# Patient Record
Sex: FEMALE | Race: WHITE | Employment: OTHER | URBAN - NONMETROPOLITAN AREA
[De-identification: names, ages, dates, MRNs, and addresses within clinical notes are randomized per-mention and may not be internally consistent; named-entity substitution may affect disease eponyms.]

---

## 2024-05-17 PROBLEM — I95.9 HYPOTENSION: Status: ACTIVE | Noted: 2024-05-17

## 2024-05-18 ENCOUNTER — HOSPITAL ENCOUNTER (INPATIENT)
Age: 79
LOS: 1 days | Discharge: HOME | End: 2024-05-18
Attending: STUDENT IN AN ORGANIZED HEALTH CARE EDUCATION/TRAINING PROGRAM
Payer: MEDICARE

## 2024-05-18 VITALS
HEART RATE: 84 BPM | HEIGHT: 64 IN | BODY MASS INDEX: 28.53 KG/M2 | SYSTOLIC BLOOD PRESSURE: 86 MMHG | WEIGHT: 167.11 LBS | TEMPERATURE: 97.2 F | DIASTOLIC BLOOD PRESSURE: 43 MMHG | RESPIRATION RATE: 17 BRPM | OXYGEN SATURATION: 100 %

## 2024-05-18 LAB
ALBUMIN SERPL BCG-MCNC: 3.5 G/DL (ref 3.5–5.1)
ALP SERPL-CCNC: 297 U/L (ref 38–126)
ALT SERPL W/O P-5'-P-CCNC: 12 U/L (ref 11–66)
ANION GAP SERPL CALC-SCNC: 13 MEQ/L (ref 8–16)
AST SERPL-CCNC: 29 U/L (ref 5–40)
BASOPHILS ABSOLUTE: 0 THOU/MM3 (ref 0–0.1)
BASOPHILS NFR BLD AUTO: 0.5 %
BILIRUB SERPL-MCNC: 0.3 MG/DL (ref 0.3–1.2)
BUN SERPL-MCNC: 30 MG/DL (ref 7–22)
CALCIUM SERPL-MCNC: 9.4 MG/DL (ref 8.5–10.5)
CHLORIDE SERPL-SCNC: 96 MEQ/L (ref 98–111)
CO2 SERPL-SCNC: 28 MEQ/L (ref 23–33)
CREAT SERPL-MCNC: 6 MG/DL (ref 0.4–1.2)
DEPRECATED RDW RBC AUTO: 54.4 FL (ref 35–45)
EOSINOPHIL NFR BLD AUTO: 2.9 %
EOSINOPHILS ABSOLUTE: 0.2 THOU/MM3 (ref 0–0.4)
ERYTHROCYTE [DISTWIDTH] IN BLOOD BY AUTOMATED COUNT: 15.3 % (ref 11.5–14.5)
GFR SERPL CREATININE-BSD FRML MDRD: 7 ML/MIN/1.73M2
GLUCOSE SERPL-MCNC: 79 MG/DL (ref 70–108)
HBV CORE IGM SERPL QL IA: NEGATIVE
HBV SURFACE AB SER QL IA: NEGATIVE
HBV SURFACE AG SERPL QL IA: NEGATIVE
HCT VFR BLD AUTO: 31.5 % (ref 37–47)
HGB BLD-MCNC: 9.8 GM/DL (ref 12–16)
IMM GRANULOCYTES # BLD AUTO: 0.02 THOU/MM3 (ref 0–0.07)
IMM GRANULOCYTES NFR BLD AUTO: 0.3 %
LYMPHOCYTES ABSOLUTE: 1.7 THOU/MM3 (ref 1–4.8)
LYMPHOCYTES NFR BLD AUTO: 23.3 %
MCH RBC QN AUTO: 31.3 PG (ref 26–33)
MCHC RBC AUTO-ENTMCNC: 31.1 GM/DL (ref 32.2–35.5)
MCV RBC AUTO: 100.6 FL (ref 81–99)
MONOCYTES ABSOLUTE: 1.3 THOU/MM3 (ref 0.4–1.3)
MONOCYTES NFR BLD AUTO: 18 %
NEUTROPHILS ABSOLUTE: 4 THOU/MM3 (ref 1.8–7.7)
NEUTROPHILS NFR BLD AUTO: 55 %
NRBC BLD AUTO-RTO: 0 /100 WBC
PLATELET # BLD AUTO: 288 THOU/MM3 (ref 130–400)
PMV BLD AUTO: 9.2 FL (ref 9.4–12.4)
POTASSIUM SERPL-SCNC: 4.6 MEQ/L (ref 3.5–5.2)
PROT SERPL-MCNC: 6.2 G/DL (ref 6.1–8)
RBC # BLD AUTO: 3.13 MILL/MM3 (ref 4.2–5.4)
SODIUM SERPL-SCNC: 137 MEQ/L (ref 135–145)
WBC # BLD AUTO: 7.3 THOU/MM3 (ref 4.8–10.8)

## 2024-05-18 PROCEDURE — 6370000000 HC RX 637 (ALT 250 FOR IP)

## 2024-05-18 PROCEDURE — 86705 HEP B CORE ANTIBODY IGM: CPT

## 2024-05-18 PROCEDURE — 99223 1ST HOSP IP/OBS HIGH 75: CPT

## 2024-05-18 PROCEDURE — 6360000002 HC RX W HCPCS: Performed by: INTERNAL MEDICINE

## 2024-05-18 PROCEDURE — 80053 COMPREHEN METABOLIC PANEL: CPT

## 2024-05-18 PROCEDURE — 2580000003 HC RX 258: Performed by: PHYSICIAN ASSISTANT

## 2024-05-18 PROCEDURE — 87340 HEPATITIS B SURFACE AG IA: CPT

## 2024-05-18 PROCEDURE — 1200000003 HC TELEMETRY R&B

## 2024-05-18 PROCEDURE — 90935 HEMODIALYSIS ONE EVALUATION: CPT

## 2024-05-18 PROCEDURE — P9047 ALBUMIN (HUMAN), 25%, 50ML: HCPCS | Performed by: INTERNAL MEDICINE

## 2024-05-18 PROCEDURE — 86706 HEP B SURFACE ANTIBODY: CPT

## 2024-05-18 PROCEDURE — 85025 COMPLETE CBC W/AUTO DIFF WBC: CPT

## 2024-05-18 PROCEDURE — 6370000000 HC RX 637 (ALT 250 FOR IP): Performed by: INTERNAL MEDICINE

## 2024-05-18 PROCEDURE — 36415 COLL VENOUS BLD VENIPUNCTURE: CPT

## 2024-05-18 RX ORDER — ALBUMIN (HUMAN) 12.5 G/50ML
25 SOLUTION INTRAVENOUS ONCE
Status: COMPLETED | OUTPATIENT
Start: 2024-05-18 | End: 2024-05-18

## 2024-05-18 RX ORDER — MIDODRINE HYDROCHLORIDE 10 MG/1
15 TABLET ORAL 3 TIMES DAILY
Qty: 120 TABLET | Refills: 0 | Status: SHIPPED | OUTPATIENT
Start: 2024-05-18

## 2024-05-18 RX ORDER — ONDANSETRON 2 MG/ML
4 INJECTION INTRAMUSCULAR; INTRAVENOUS EVERY 6 HOURS PRN
Status: DISCONTINUED | OUTPATIENT
Start: 2024-05-18 | End: 2024-05-18 | Stop reason: HOSPADM

## 2024-05-18 RX ORDER — ACETAMINOPHEN 650 MG/1
650 SUPPOSITORY RECTAL EVERY 6 HOURS PRN
Status: DISCONTINUED | OUTPATIENT
Start: 2024-05-18 | End: 2024-05-18 | Stop reason: SDUPTHER

## 2024-05-18 RX ORDER — LANOLIN ALCOHOL/MO/W.PET/CERES
3 CREAM (GRAM) TOPICAL NIGHTLY PRN
COMMUNITY

## 2024-05-18 RX ORDER — SODIUM CHLORIDE 0.9 % (FLUSH) 0.9 %
5-40 SYRINGE (ML) INJECTION PRN
Status: DISCONTINUED | OUTPATIENT
Start: 2024-05-18 | End: 2024-05-18 | Stop reason: HOSPADM

## 2024-05-18 RX ORDER — OXYCODONE HYDROCHLORIDE 5 MG/1
5 TABLET ORAL EVERY 8 HOURS PRN
COMMUNITY

## 2024-05-18 RX ORDER — ACETAMINOPHEN 325 MG/1
650 TABLET ORAL EVERY 6 HOURS PRN
Status: DISCONTINUED | OUTPATIENT
Start: 2024-05-18 | End: 2024-05-18 | Stop reason: HOSPADM

## 2024-05-18 RX ORDER — POLYETHYLENE GLYCOL 3350 17 G/17G
17 POWDER, FOR SOLUTION ORAL DAILY PRN
Status: DISCONTINUED | OUTPATIENT
Start: 2024-05-18 | End: 2024-05-18 | Stop reason: HOSPADM

## 2024-05-18 RX ORDER — ALBUTEROL SULFATE 90 UG/1
2 AEROSOL, METERED RESPIRATORY (INHALATION) EVERY 6 HOURS PRN
Status: DISCONTINUED | OUTPATIENT
Start: 2024-05-18 | End: 2024-05-18 | Stop reason: HOSPADM

## 2024-05-18 RX ORDER — ACETAMINOPHEN 325 MG/1
650 TABLET ORAL EVERY 6 HOURS PRN
Status: DISCONTINUED | OUTPATIENT
Start: 2024-05-18 | End: 2024-05-18 | Stop reason: SDUPTHER

## 2024-05-18 RX ORDER — IPRATROPIUM BROMIDE AND ALBUTEROL SULFATE 2.5; .5 MG/3ML; MG/3ML
1 SOLUTION RESPIRATORY (INHALATION) EVERY 4 HOURS
COMMUNITY

## 2024-05-18 RX ORDER — MIDODRINE HYDROCHLORIDE 10 MG/1
10 TABLET ORAL 3 TIMES DAILY
Status: DISCONTINUED | OUTPATIENT
Start: 2024-05-18 | End: 2024-05-18

## 2024-05-18 RX ORDER — POLYETHYLENE GLYCOL 3350 17 G/17G
17 POWDER, FOR SOLUTION ORAL DAILY
Status: DISCONTINUED | OUTPATIENT
Start: 2024-05-18 | End: 2024-05-18 | Stop reason: HOSPADM

## 2024-05-18 RX ORDER — FERROUS SULFATE 325(65) MG
325 TABLET ORAL
COMMUNITY

## 2024-05-18 RX ORDER — SODIUM CHLORIDE 9 MG/ML
INJECTION, SOLUTION INTRAVENOUS PRN
Status: DISCONTINUED | OUTPATIENT
Start: 2024-05-18 | End: 2024-05-18 | Stop reason: HOSPADM

## 2024-05-18 RX ORDER — SENNOSIDES A AND B 8.6 MG/1
1 TABLET, FILM COATED ORAL NIGHTLY
Status: DISCONTINUED | OUTPATIENT
Start: 2024-05-18 | End: 2024-05-18 | Stop reason: HOSPADM

## 2024-05-18 RX ORDER — LEVOTHYROXINE SODIUM 0.05 MG/1
50 TABLET ORAL DAILY
Status: DISCONTINUED | OUTPATIENT
Start: 2024-05-18 | End: 2024-05-18 | Stop reason: HOSPADM

## 2024-05-18 RX ORDER — ONDANSETRON 4 MG/1
4 TABLET, ORALLY DISINTEGRATING ORAL EVERY 8 HOURS PRN
Status: DISCONTINUED | OUTPATIENT
Start: 2024-05-18 | End: 2024-05-18 | Stop reason: HOSPADM

## 2024-05-18 RX ORDER — OXYCODONE HYDROCHLORIDE 5 MG/1
5 TABLET ORAL EVERY 8 HOURS PRN
Status: DISCONTINUED | OUTPATIENT
Start: 2024-05-18 | End: 2024-05-18 | Stop reason: HOSPADM

## 2024-05-18 RX ORDER — LANOLIN ALCOHOL/MO/W.PET/CERES
3 CREAM (GRAM) TOPICAL NIGHTLY PRN
Status: DISCONTINUED | OUTPATIENT
Start: 2024-05-18 | End: 2024-05-18 | Stop reason: HOSPADM

## 2024-05-18 RX ORDER — MIDODRINE HYDROCHLORIDE 10 MG/1
15 TABLET ORAL 3 TIMES DAILY
Qty: 120 TABLET | Refills: 0 | Status: SHIPPED | OUTPATIENT
Start: 2024-05-18 | End: 2024-05-18

## 2024-05-18 RX ORDER — OMEPRAZOLE 20 MG/1
40 CAPSULE, DELAYED RELEASE ORAL DAILY
COMMUNITY

## 2024-05-18 RX ORDER — SODIUM CHLORIDE 0.9 % (FLUSH) 0.9 %
5-40 SYRINGE (ML) INJECTION EVERY 12 HOURS SCHEDULED
Status: DISCONTINUED | OUTPATIENT
Start: 2024-05-18 | End: 2024-05-18 | Stop reason: HOSPADM

## 2024-05-18 RX ORDER — PANTOPRAZOLE SODIUM 40 MG/1
40 TABLET, DELAYED RELEASE ORAL
Status: DISCONTINUED | OUTPATIENT
Start: 2024-05-18 | End: 2024-05-18 | Stop reason: HOSPADM

## 2024-05-18 RX ORDER — IPRATROPIUM BROMIDE AND ALBUTEROL SULFATE 2.5; .5 MG/3ML; MG/3ML
1 SOLUTION RESPIRATORY (INHALATION) EVERY 4 HOURS PRN
Status: DISCONTINUED | OUTPATIENT
Start: 2024-05-18 | End: 2024-05-18 | Stop reason: HOSPADM

## 2024-05-18 RX ORDER — ACETAMINOPHEN 160 MG
TABLET,DISINTEGRATING ORAL
COMMUNITY

## 2024-05-18 RX ORDER — MIDODRINE HYDROCHLORIDE 5 MG/1
10 TABLET ORAL 3 TIMES DAILY
Status: ON HOLD | COMMUNITY
End: 2024-05-18

## 2024-05-18 RX ORDER — ACETAMINOPHEN 650 MG/1
650 SUPPOSITORY RECTAL EVERY 6 HOURS PRN
Status: DISCONTINUED | OUTPATIENT
Start: 2024-05-18 | End: 2024-05-18 | Stop reason: HOSPADM

## 2024-05-18 RX ORDER — LEVOTHYROXINE SODIUM 0.05 MG/1
50 TABLET ORAL DAILY
COMMUNITY

## 2024-05-18 RX ORDER — CETIRIZINE HYDROCHLORIDE 10 MG/1
10 TABLET ORAL DAILY
Status: DISCONTINUED | OUTPATIENT
Start: 2024-05-18 | End: 2024-05-18 | Stop reason: HOSPADM

## 2024-05-18 RX ORDER — CETIRIZINE HYDROCHLORIDE 10 MG/1
10 TABLET ORAL DAILY
COMMUNITY

## 2024-05-18 RX ORDER — ALBUTEROL SULFATE 90 UG/1
2 AEROSOL, METERED RESPIRATORY (INHALATION) EVERY 6 HOURS PRN
COMMUNITY

## 2024-05-18 RX ORDER — DOCUSATE SODIUM 100 MG/1
100 CAPSULE, LIQUID FILLED ORAL DAILY
Status: DISCONTINUED | OUTPATIENT
Start: 2024-05-18 | End: 2024-05-18 | Stop reason: HOSPADM

## 2024-05-18 RX ADMIN — MIDODRINE HYDROCHLORIDE 10 MG: 10 TABLET ORAL at 08:26

## 2024-05-18 RX ADMIN — PANTOPRAZOLE SODIUM 40 MG: 40 TABLET, DELAYED RELEASE ORAL at 05:18

## 2024-05-18 RX ADMIN — ALBUMIN (HUMAN) 25 G: 0.25 INJECTION, SOLUTION INTRAVENOUS at 10:51

## 2024-05-18 RX ADMIN — LEVOTHYROXINE SODIUM 50 MCG: 0.05 TABLET ORAL at 05:18

## 2024-05-18 RX ADMIN — CETIRIZINE HYDROCHLORIDE 10 MG: 10 TABLET, FILM COATED ORAL at 08:26

## 2024-05-18 RX ADMIN — SODIUM CHLORIDE, PRESERVATIVE FREE 10 ML: 5 INJECTION INTRAVENOUS at 08:26

## 2024-05-18 RX ADMIN — DOCUSATE SODIUM 100 MG: 100 CAPSULE, LIQUID FILLED ORAL at 08:26

## 2024-05-18 RX ADMIN — MIDODRINE HYDROCHLORIDE 15 MG: 10 TABLET ORAL at 15:09

## 2024-05-18 NOTE — RT PROTOCOL NOTE
order mode.        4-6 - enter or revise RT Bronchodilator order(s) to two equivalent RT bronchodilator orders with one order with BID Frequency and one order with Frequency of every 4 hours PRN wheezing or increased work of breathing using Per Protocol order mode.        7-10 - enter or revise RT Bronchodilator order(s) to two equivalent RT bronchodilator orders with one order with TID Frequency and one order with Frequency of every 4 hours PRN wheezing or increased work of breathing using Per Protocol order mode.       11-13 - enter or revise RT Bronchodilator order(s) to one equivalent RT bronchodilator order with QID Frequency and an Albuterol order with Frequency of every 4 hours PRN wheezing or increased work of breathing using Per Protocol order mode.      Greater than 13 - enter or revise RT Bronchodilator order(s) to one equivalent RT bronchodilator order with every 4 hours Frequency and an Albuterol order with Frequency of every 2 hours PRN wheezing or increased work of breathing using Per Protocol order mode.     RT to enter RT Home Evaluation for COPD & MDI Assessment order using Per Protocol order mode.    Electronically signed by Mauro Bacon RCP on 5/18/2024 at 6:52 AM

## 2024-05-18 NOTE — PROGRESS NOTES
Discharge teaching and instructions for diagnosis/procedure of hypotension completed with patient using teachback method. AVS reviewed. Printed prescriptions given to patient. Patient voiced understanding regarding prescriptions, follow up appointments, and care of self at home. Discharged in a wheelchair to  home with support per family

## 2024-05-18 NOTE — CONSULTS
[facility]  Nephrology Attending Consult Note      Reason for Consult:  ESRD  Requesting Physician:  Erin Alvarado    CHIEF COMPLAINT:  Hypotension     History Obtained From:  patient, electronic medical record    HISTORY OF PRESENT ILLNESS:    Patient with end-stage renal disease on hemodialysis every Monday Wednesday Friday chronic hypotension on midodrine severe pulm hypertension on selexipag COPD paroxysmal atrial fibrillation transferred from outside hospital.  Patient went to outpatient dialysis unit yesterday found to be hypotensive therefore patient was sent to emergency room for further evaluation.  No chest pain, shortness of breath, nausea or vomiting.  No fever or chills.  Patient had generalized weakness.  Patient was seen and examined during hemodialysis    Past Medical History:    End-stage renal disease on hemodialysis  Paroxysmal atrial fibrillation  Severe pulmonary hypertension  Chronic hypotension    Past Surgical History:    Right upper extremity AV fistula placement    Current Medications:    Current Facility-Administered Medications: sodium chloride flush 0.9 % injection 5-40 mL, 5-40 mL, IntraVENous, 2 times per day  sodium chloride flush 0.9 % injection 5-40 mL, 5-40 mL, IntraVENous, PRN  0.9 % sodium chloride infusion, , IntraVENous, PRN  ondansetron (ZOFRAN-ODT) disintegrating tablet 4 mg, 4 mg, Oral, Q8H PRN **OR** ondansetron (ZOFRAN) injection 4 mg, 4 mg, IntraVENous, Q6H PRN  acetaminophen (TYLENOL) tablet 650 mg, 650 mg, Oral, Q6H PRN **OR** acetaminophen (TYLENOL) suppository 650 mg, 650 mg, Rectal, Q6H PRN  albuterol sulfate HFA (PROVENTIL;VENTOLIN;PROAIR) 108 (90 Base) MCG/ACT inhaler 2 puff, 2 puff, Inhalation, Q6H PRN  cetirizine (ZYRTEC) tablet 10 mg, 10 mg, Oral, Daily  ipratropium 0.5 mg-albuterol 2.5 mg (DUONEB) nebulizer solution 1 Dose, 1 Dose, Inhalation, Q4H PRN  levothyroxine (SYNTHROID) tablet 50 mcg, 50 mcg, Oral, Daily  melatonin tablet 3 mg,

## 2024-05-18 NOTE — FLOWSHEET NOTE
Stable 3.5 hour TX completed. Removed 1.5 L of fluid as ordered. Gave albumin 25g to help with fluid removal. pt tolerated well. Bilateral cath ports flushed with normal saline, clamped, and secured with tegos. CVC drsg clean, dry, and intact. Report called to primary RN. TX record printed for scanning into EMR.   05/18/24 0915 05/18/24 1316   Vital Signs   BP (!) 100/57 104/63   Temp 97.8 °F (36.6 °C) 97.2 °F (36.2 °C)   Pulse 88 84   Respirations 17 17   SpO2 99 % 100 %   Weight - Scale 77.3 kg (170 lb 6.7 oz) 75.8 kg (167 lb 1.7 oz)   Weight Method Bed scale Bed scale   Percent Weight Change -3.01 -1.94   Post-Hemodialysis Assessment   Post-Treatment Procedures  --  Blood returned;Catheter Capped, clamped with Saline x2 ports   Machine Disinfection Process  --  Acid/Vinegar Clean;Heat Disinfect;Exterior Machine Disinfection   Blood Volume Processed (Liters)  --  70.6 L   Dialyzer Clearance  --  Lightly streaked   Duration of Treatment (minutes)  --  210 minutes   Heparin Amount Administered During Treatment (mL)  --  0 mL   Hemodialysis Intake (ml)  --  400 ml   Hemodialysis Output (ml)  --  1900 ml   NET Removed (ml)  --  1500   Tolerated Treatment  --  Good

## 2024-05-20 PROBLEM — N18.6 ESRD (END STAGE RENAL DISEASE) (HCC): Status: ACTIVE | Noted: 2024-05-20

## 2024-12-06 ENCOUNTER — HOSPITAL ENCOUNTER (INPATIENT)
Age: 79
LOS: 4 days | Discharge: ANOTHER HOSPITAL | DRG: 602 | End: 2024-12-10
Attending: STUDENT IN AN ORGANIZED HEALTH CARE EDUCATION/TRAINING PROGRAM | Admitting: INTERNAL MEDICINE
Payer: MEDICARE

## 2024-12-06 DIAGNOSIS — I48.91 A-FIB (HCC): ICD-10-CM

## 2024-12-06 LAB
ALBUMIN SERPL BCG-MCNC: 3.5 G/DL (ref 3.5–5.1)
ALP SERPL-CCNC: 286 U/L (ref 38–126)
ALT SERPL W/O P-5'-P-CCNC: 15 U/L (ref 11–66)
ANION GAP SERPL CALC-SCNC: 15 MEQ/L (ref 8–16)
AST SERPL-CCNC: 26 U/L (ref 5–40)
BASOPHILS ABSOLUTE: 0 THOU/MM3 (ref 0–0.1)
BASOPHILS NFR BLD AUTO: 0.4 %
BILIRUB CONJ SERPL-MCNC: 0.2 MG/DL (ref 0.1–13.8)
BILIRUB SERPL-MCNC: 0.4 MG/DL (ref 0.3–1.2)
BUN SERPL-MCNC: 35 MG/DL (ref 7–22)
CALCIUM SERPL-MCNC: 9.2 MG/DL (ref 8.5–10.5)
CHLORIDE SERPL-SCNC: 94 MEQ/L (ref 98–111)
CO2 SERPL-SCNC: 24 MEQ/L (ref 23–33)
CREAT SERPL-MCNC: 5.7 MG/DL (ref 0.4–1.2)
CRP SERPL-MCNC: 2 MG/DL (ref 0–1)
DEPRECATED RDW RBC AUTO: 48.9 FL (ref 35–45)
EOSINOPHIL NFR BLD AUTO: 3.7 %
EOSINOPHILS ABSOLUTE: 0.3 THOU/MM3 (ref 0–0.4)
ERYTHROCYTE [DISTWIDTH] IN BLOOD BY AUTOMATED COUNT: 13.9 % (ref 11.5–14.5)
ERYTHROCYTE [SEDIMENTATION RATE] IN BLOOD BY WESTERGREN METHOD: 42 MM/HR (ref 0–20)
GFR SERPL CREATININE-BSD FRML MDRD: 7 ML/MIN/1.73M2
GLUCOSE SERPL-MCNC: 97 MG/DL (ref 70–108)
HCT VFR BLD AUTO: 31.5 % (ref 37–47)
HGB BLD-MCNC: 10.1 GM/DL (ref 12–16)
IMM GRANULOCYTES # BLD AUTO: 0.03 THOU/MM3 (ref 0–0.07)
IMM GRANULOCYTES NFR BLD AUTO: 0.4 %
LACTATE SERPL-SCNC: 1.3 MMOL/L (ref 0.5–2)
LYMPHOCYTES ABSOLUTE: 1.1 THOU/MM3 (ref 1–4.8)
LYMPHOCYTES NFR BLD AUTO: 14.9 %
MAGNESIUM SERPL-MCNC: 2 MG/DL (ref 1.6–2.4)
MCH RBC QN AUTO: 30.9 PG (ref 26–33)
MCHC RBC AUTO-ENTMCNC: 32.1 GM/DL (ref 32.2–35.5)
MCV RBC AUTO: 96.3 FL (ref 81–99)
MONOCYTES ABSOLUTE: 1.2 THOU/MM3 (ref 0.4–1.3)
MONOCYTES NFR BLD AUTO: 15.7 %
NEUTROPHILS ABSOLUTE: 4.9 THOU/MM3 (ref 1.8–7.7)
NEUTROPHILS NFR BLD AUTO: 64.9 %
NRBC BLD AUTO-RTO: 0 /100 WBC
PLATELET # BLD AUTO: 264 THOU/MM3 (ref 130–400)
PMV BLD AUTO: 8.9 FL (ref 9.4–12.4)
POTASSIUM SERPL-SCNC: 5.5 MEQ/L (ref 3.5–5.2)
PROT SERPL-MCNC: 6.2 G/DL (ref 6.1–8)
RBC # BLD AUTO: 3.27 MILL/MM3 (ref 4.2–5.4)
SODIUM SERPL-SCNC: 133 MEQ/L (ref 135–145)
T4 FREE SERPL-MCNC: 1.16 NG/DL (ref 0.93–1.68)
TSH SERPL DL<=0.005 MIU/L-ACNC: 2.68 UIU/ML (ref 0.4–4.2)
WBC # BLD AUTO: 7.6 THOU/MM3 (ref 4.8–10.8)

## 2024-12-06 PROCEDURE — 86140 C-REACTIVE PROTEIN: CPT

## 2024-12-06 PROCEDURE — 82248 BILIRUBIN DIRECT: CPT

## 2024-12-06 PROCEDURE — 84439 ASSAY OF FREE THYROXINE: CPT

## 2024-12-06 PROCEDURE — 93005 ELECTROCARDIOGRAM TRACING: CPT

## 2024-12-06 PROCEDURE — 36415 COLL VENOUS BLD VENIPUNCTURE: CPT

## 2024-12-06 PROCEDURE — 87040 BLOOD CULTURE FOR BACTERIA: CPT

## 2024-12-06 PROCEDURE — 94640 AIRWAY INHALATION TREATMENT: CPT

## 2024-12-06 PROCEDURE — 6370000000 HC RX 637 (ALT 250 FOR IP)

## 2024-12-06 PROCEDURE — 6360000002 HC RX W HCPCS

## 2024-12-06 PROCEDURE — 87641 MR-STAPH DNA AMP PROBE: CPT

## 2024-12-06 PROCEDURE — 99223 1ST HOSP IP/OBS HIGH 75: CPT | Performed by: INTERNAL MEDICINE

## 2024-12-06 PROCEDURE — 83735 ASSAY OF MAGNESIUM: CPT

## 2024-12-06 PROCEDURE — 80053 COMPREHEN METABOLIC PANEL: CPT

## 2024-12-06 PROCEDURE — 84443 ASSAY THYROID STIM HORMONE: CPT

## 2024-12-06 PROCEDURE — 2580000003 HC RX 258

## 2024-12-06 PROCEDURE — 94761 N-INVAS EAR/PLS OXIMETRY MLT: CPT

## 2024-12-06 PROCEDURE — 85025 COMPLETE CBC W/AUTO DIFF WBC: CPT

## 2024-12-06 PROCEDURE — 2140000000 HC CCU INTERMEDIATE R&B

## 2024-12-06 PROCEDURE — 85651 RBC SED RATE NONAUTOMATED: CPT

## 2024-12-06 PROCEDURE — 83605 ASSAY OF LACTIC ACID: CPT

## 2024-12-06 RX ORDER — ENOXAPARIN SODIUM 100 MG/ML
40 INJECTION SUBCUTANEOUS DAILY
Status: DISCONTINUED | OUTPATIENT
Start: 2024-12-06 | End: 2024-12-06

## 2024-12-06 RX ORDER — OXYCODONE HYDROCHLORIDE 5 MG/1
5 TABLET ORAL EVERY 8 HOURS PRN
Status: DISCONTINUED | OUTPATIENT
Start: 2024-12-06 | End: 2024-12-11 | Stop reason: HOSPADM

## 2024-12-06 RX ORDER — FLUTICASONE PROPIONATE 50 MCG
1 SPRAY, SUSPENSION (ML) NASAL DAILY PRN
Status: DISCONTINUED | OUTPATIENT
Start: 2024-12-06 | End: 2024-12-11 | Stop reason: HOSPADM

## 2024-12-06 RX ORDER — LEVOTHYROXINE SODIUM 50 UG/1
50 TABLET ORAL DAILY
Status: DISCONTINUED | OUTPATIENT
Start: 2024-12-07 | End: 2024-12-11 | Stop reason: HOSPADM

## 2024-12-06 RX ORDER — SODIUM CHLORIDE 9 MG/ML
INJECTION, SOLUTION INTRAVENOUS PRN
Status: DISCONTINUED | OUTPATIENT
Start: 2024-12-06 | End: 2024-12-11 | Stop reason: HOSPADM

## 2024-12-06 RX ORDER — ACETAMINOPHEN 650 MG/1
650 SUPPOSITORY RECTAL EVERY 6 HOURS PRN
Status: DISCONTINUED | OUTPATIENT
Start: 2024-12-06 | End: 2024-12-11 | Stop reason: HOSPADM

## 2024-12-06 RX ORDER — IVABRADINE 5 MG/1
2.5 TABLET, FILM COATED ORAL 2 TIMES DAILY WITH MEALS
Status: DISCONTINUED | OUTPATIENT
Start: 2024-12-06 | End: 2024-12-09

## 2024-12-06 RX ORDER — IPRATROPIUM BROMIDE AND ALBUTEROL SULFATE 2.5; .5 MG/3ML; MG/3ML
1 SOLUTION RESPIRATORY (INHALATION) EVERY 4 HOURS
Status: DISCONTINUED | OUTPATIENT
Start: 2024-12-06 | End: 2024-12-07

## 2024-12-06 RX ORDER — FLUTICASONE PROPIONATE 50 MCG
1 SPRAY, SUSPENSION (ML) NASAL DAILY
COMMUNITY
Start: 2024-10-11

## 2024-12-06 RX ORDER — MAGNESIUM SULFATE IN WATER 40 MG/ML
2000 INJECTION, SOLUTION INTRAVENOUS PRN
Status: DISCONTINUED | OUTPATIENT
Start: 2024-12-06 | End: 2024-12-06

## 2024-12-06 RX ORDER — ONDANSETRON 4 MG/1
4 TABLET, ORALLY DISINTEGRATING ORAL EVERY 8 HOURS PRN
Status: DISCONTINUED | OUTPATIENT
Start: 2024-12-06 | End: 2024-12-11 | Stop reason: HOSPADM

## 2024-12-06 RX ORDER — POTASSIUM CHLORIDE 7.45 MG/ML
10 INJECTION INTRAVENOUS PRN
Status: DISCONTINUED | OUTPATIENT
Start: 2024-12-06 | End: 2024-12-06

## 2024-12-06 RX ORDER — CETIRIZINE HYDROCHLORIDE 10 MG/1
10 TABLET ORAL DAILY
Status: DISCONTINUED | OUTPATIENT
Start: 2024-12-06 | End: 2024-12-11 | Stop reason: HOSPADM

## 2024-12-06 RX ORDER — ONDANSETRON 2 MG/ML
4 INJECTION INTRAMUSCULAR; INTRAVENOUS EVERY 6 HOURS PRN
Status: DISCONTINUED | OUTPATIENT
Start: 2024-12-06 | End: 2024-12-11 | Stop reason: HOSPADM

## 2024-12-06 RX ORDER — PANTOPRAZOLE SODIUM 40 MG/1
40 TABLET, DELAYED RELEASE ORAL
Status: DISCONTINUED | OUTPATIENT
Start: 2024-12-07 | End: 2024-12-11 | Stop reason: HOSPADM

## 2024-12-06 RX ORDER — ALBUTEROL SULFATE 90 UG/1
2 INHALANT RESPIRATORY (INHALATION) EVERY 6 HOURS PRN
Status: DISCONTINUED | OUTPATIENT
Start: 2024-12-06 | End: 2024-12-11 | Stop reason: HOSPADM

## 2024-12-06 RX ORDER — SODIUM CHLORIDE 0.9 % (FLUSH) 0.9 %
5-40 SYRINGE (ML) INJECTION PRN
Status: DISCONTINUED | OUTPATIENT
Start: 2024-12-06 | End: 2024-12-11 | Stop reason: HOSPADM

## 2024-12-06 RX ORDER — POLYETHYLENE GLYCOL 3350 17 G/17G
17 POWDER, FOR SOLUTION ORAL DAILY PRN
Status: DISCONTINUED | OUTPATIENT
Start: 2024-12-06 | End: 2024-12-11 | Stop reason: HOSPADM

## 2024-12-06 RX ORDER — SODIUM CHLORIDE 0.9 % (FLUSH) 0.9 %
5-40 SYRINGE (ML) INJECTION EVERY 12 HOURS SCHEDULED
Status: DISCONTINUED | OUTPATIENT
Start: 2024-12-06 | End: 2024-12-11 | Stop reason: HOSPADM

## 2024-12-06 RX ORDER — HEPARIN SODIUM 5000 [USP'U]/ML
5000 INJECTION, SOLUTION INTRAVENOUS; SUBCUTANEOUS EVERY 8 HOURS SCHEDULED
Status: DISCONTINUED | OUTPATIENT
Start: 2024-12-06 | End: 2024-12-07

## 2024-12-06 RX ORDER — MACITENTAN 10 MG/1
10 TABLET, FILM COATED ORAL DAILY
COMMUNITY

## 2024-12-06 RX ORDER — ACETAMINOPHEN 325 MG/1
650 TABLET ORAL EVERY 6 HOURS PRN
Status: DISCONTINUED | OUTPATIENT
Start: 2024-12-06 | End: 2024-12-11 | Stop reason: HOSPADM

## 2024-12-06 RX ORDER — IVABRADINE 5 MG/1
2.5 TABLET, FILM COATED ORAL 2 TIMES DAILY WITH MEALS
COMMUNITY

## 2024-12-06 RX ORDER — POTASSIUM CHLORIDE 1500 MG/1
40 TABLET, EXTENDED RELEASE ORAL PRN
Status: DISCONTINUED | OUTPATIENT
Start: 2024-12-06 | End: 2024-12-06

## 2024-12-06 RX ORDER — VITAMIN B COMPLEX
2000 TABLET ORAL DAILY
Status: DISCONTINUED | OUTPATIENT
Start: 2024-12-07 | End: 2024-12-11 | Stop reason: HOSPADM

## 2024-12-06 RX ORDER — MIDODRINE HYDROCHLORIDE 10 MG/1
10 TABLET ORAL 2 TIMES DAILY WITH MEALS
Status: DISCONTINUED | OUTPATIENT
Start: 2024-12-06 | End: 2024-12-07

## 2024-12-06 RX ADMIN — HEPARIN SODIUM 5000 UNITS: 5000 INJECTION INTRAVENOUS; SUBCUTANEOUS at 22:10

## 2024-12-06 RX ADMIN — MIDODRINE HYDROCHLORIDE 10 MG: 10 TABLET ORAL at 21:17

## 2024-12-06 RX ADMIN — IPRATROPIUM BROMIDE AND ALBUTEROL SULFATE 1 DOSE: .5; 3 SOLUTION RESPIRATORY (INHALATION) at 20:08

## 2024-12-06 RX ADMIN — IVABRADINE 2.5 MG: 5 TABLET, FILM COATED ORAL at 22:10

## 2024-12-06 RX ADMIN — CETIRIZINE HYDROCHLORIDE 10 MG: 10 TABLET, FILM COATED ORAL at 22:10

## 2024-12-06 RX ADMIN — CEFTRIAXONE SODIUM 1000 MG: 1 INJECTION, POWDER, FOR SOLUTION INTRAMUSCULAR; INTRAVENOUS at 21:12

## 2024-12-06 RX ADMIN — SODIUM CHLORIDE, PRESERVATIVE FREE 10 ML: 5 INJECTION INTRAVENOUS at 20:36

## 2024-12-06 RX ADMIN — Medication 3 MG: at 22:10

## 2024-12-06 RX ADMIN — SODIUM ZIRCONIUM CYCLOSILICATE 10 G: 10 POWDER, FOR SUSPENSION ORAL at 20:36

## 2024-12-06 ASSESSMENT — LIFESTYLE VARIABLES
HOW MANY STANDARD DRINKS CONTAINING ALCOHOL DO YOU HAVE ON A TYPICAL DAY: PATIENT DOES NOT DRINK
HOW OFTEN DO YOU HAVE A DRINK CONTAINING ALCOHOL: NEVER

## 2024-12-06 NOTE — H&P
questionnaire     Fear of Current or Ex-Partner: No     Emotionally Abused: No     Physically Abused: No     Sexually Abused: No   Housing Stability: Low Risk  (12/6/2024)    Housing Stability Vital Sign     Unable to Pay for Housing in the Last Year: No     Number of Times Moved in the Last Year: 0     Homeless in the Last Year: No        Code status: Prior     Electronically signed by Jossy Brito MD on 12/6/2024 at 5:35 PM.   Case was discussed with the Attending, Dr. Javier Julien.

## 2024-12-06 NOTE — PROCEDURES
PROCEDURE NOTE  Date: 12/6/2024   Name: Alisha Armas  YOB: 1945    Procedures  EKG completed, given to Ronit GILLILAND

## 2024-12-06 NOTE — PLAN OF CARE
Problem: Discharge Planning  Goal: Discharge to home or other facility with appropriate resources  Outcome: Progressing  Flowsheets (Taken 12/6/2024 1712)  Discharge to home or other facility with appropriate resources:   Identify barriers to discharge with patient and caregiver   Arrange for interpreters to assist at discharge as needed     Problem: Safety - Adult  Goal: Free from fall injury  Outcome: Progressing     Problem: Skin/Tissue Integrity  Goal: Absence of new skin breakdown  Description: 1.  Monitor for areas of redness and/or skin breakdown  2.  Assess vascular access sites hourly  3.  Every 4-6 hours minimum:  Change oxygen saturation probe site  4.  Every 4-6 hours:  If on nasal continuous positive airway pressure, respiratory therapy assess nares and determine need for appliance change or resting period.  Outcome: Progressing     Problem: Pain  Goal: Verbalizes/displays adequate comfort level or baseline comfort level  Outcome: Progressing

## 2024-12-07 PROBLEM — I27.21 PAH (PULMONARY ARTERY HYPERTENSION) (HCC): Status: ACTIVE | Noted: 2024-12-07

## 2024-12-07 PROBLEM — E87.5 HYPERKALEMIA: Status: ACTIVE | Noted: 2024-12-07

## 2024-12-07 PROBLEM — I95.89 CHRONIC HYPOTENSION: Status: ACTIVE | Noted: 2024-12-07

## 2024-12-07 PROBLEM — I48.21 PERMANENT ATRIAL FIBRILLATION (HCC): Status: ACTIVE | Noted: 2024-12-07

## 2024-12-07 LAB
ALBUMIN SERPL BCG-MCNC: 3.5 G/DL (ref 3.5–5.1)
ALP SERPL-CCNC: 293 U/L (ref 38–126)
ALT SERPL W/O P-5'-P-CCNC: 19 U/L (ref 11–66)
ANION GAP SERPL CALC-SCNC: 19 MEQ/L (ref 8–16)
APTT PPP: 30.8 SECONDS (ref 22–38)
AST SERPL-CCNC: 39 U/L (ref 5–40)
BASOPHILS ABSOLUTE: 0 THOU/MM3 (ref 0–0.1)
BASOPHILS NFR BLD AUTO: 0.5 %
BILIRUB SERPL-MCNC: 0.5 MG/DL (ref 0.3–1.2)
BUN SERPL-MCNC: 40 MG/DL (ref 7–22)
CALCIUM SERPL-MCNC: 9.4 MG/DL (ref 8.5–10.5)
CHLORIDE SERPL-SCNC: 91 MEQ/L (ref 98–111)
CO2 SERPL-SCNC: 22 MEQ/L (ref 23–33)
CREAT SERPL-MCNC: 6.3 MG/DL (ref 0.4–1.2)
DEPRECATED RDW RBC AUTO: 48 FL (ref 35–45)
DEPRECATED RDW RBC AUTO: 48.9 FL (ref 35–45)
EKG Q-T INTERVAL: 324 MS
EKG QRS DURATION: 72 MS
EKG QTC CALCULATION (BAZETT): 428 MS
EKG R AXIS: 66 DEGREES
EKG T AXIS: -74 DEGREES
EKG VENTRICULAR RATE: 105 BPM
EOSINOPHIL NFR BLD AUTO: 3.2 %
EOSINOPHILS ABSOLUTE: 0.2 THOU/MM3 (ref 0–0.4)
ERYTHROCYTE [DISTWIDTH] IN BLOOD BY AUTOMATED COUNT: 13.9 % (ref 11.5–14.5)
ERYTHROCYTE [DISTWIDTH] IN BLOOD BY AUTOMATED COUNT: 13.9 % (ref 11.5–14.5)
GFR SERPL CREATININE-BSD FRML MDRD: 6 ML/MIN/1.73M2
GLUCOSE SERPL-MCNC: 86 MG/DL (ref 70–108)
HBV SURFACE AG SERPL QL IA: NEGATIVE
HCT VFR BLD AUTO: 29.8 % (ref 37–47)
HCT VFR BLD AUTO: 30.7 % (ref 37–47)
HEPARIN UNFRACTIONATED: 0.08 U/ML (ref 0.3–0.7)
HEPARIN UNFRACTIONATED: 0.39 U/ML (ref 0.3–0.7)
HGB BLD-MCNC: 9.7 GM/DL (ref 12–16)
HGB BLD-MCNC: 9.8 GM/DL (ref 12–16)
IMM GRANULOCYTES # BLD AUTO: 0.02 THOU/MM3 (ref 0–0.07)
IMM GRANULOCYTES NFR BLD AUTO: 0.3 %
INR PPP: 0.98 (ref 0.85–1.13)
LYMPHOCYTES ABSOLUTE: 1.3 THOU/MM3 (ref 1–4.8)
LYMPHOCYTES NFR BLD AUTO: 19.5 %
MCH RBC QN AUTO: 30.8 PG (ref 26–33)
MCH RBC QN AUTO: 30.9 PG (ref 26–33)
MCHC RBC AUTO-ENTMCNC: 31.9 GM/DL (ref 32.2–35.5)
MCHC RBC AUTO-ENTMCNC: 32.6 GM/DL (ref 32.2–35.5)
MCV RBC AUTO: 94.9 FL (ref 81–99)
MCV RBC AUTO: 96.5 FL (ref 81–99)
MONOCYTES ABSOLUTE: 0.8 THOU/MM3 (ref 0.4–1.3)
MONOCYTES NFR BLD AUTO: 12 %
MRSA DNA SPEC QL NAA+PROBE: NEGATIVE
NEUTROPHILS ABSOLUTE: 4.3 THOU/MM3 (ref 1.8–7.7)
NEUTROPHILS NFR BLD AUTO: 64.5 %
NRBC BLD AUTO-RTO: 0 /100 WBC
PLATELET # BLD AUTO: 250 THOU/MM3 (ref 130–400)
PLATELET # BLD AUTO: 268 THOU/MM3 (ref 130–400)
PMV BLD AUTO: 9.3 FL (ref 9.4–12.4)
PMV BLD AUTO: 9.3 FL (ref 9.4–12.4)
POTASSIUM SERPL-SCNC: 5.6 MEQ/L (ref 3.5–5.2)
PROT SERPL-MCNC: 6.4 G/DL (ref 6.1–8)
RBC # BLD AUTO: 3.14 MILL/MM3 (ref 4.2–5.4)
RBC # BLD AUTO: 3.18 MILL/MM3 (ref 4.2–5.4)
SODIUM SERPL-SCNC: 132 MEQ/L (ref 135–145)
VANCOMYCIN SERPL-MCNC: 17.6 UG/ML (ref 0.1–39.9)
WBC # BLD AUTO: 6.4 THOU/MM3 (ref 4.8–10.8)
WBC # BLD AUTO: 6.6 THOU/MM3 (ref 4.8–10.8)

## 2024-12-07 PROCEDURE — 2580000003 HC RX 258: Performed by: FAMILY MEDICINE

## 2024-12-07 PROCEDURE — 85730 THROMBOPLASTIN TIME PARTIAL: CPT

## 2024-12-07 PROCEDURE — 93010 ELECTROCARDIOGRAM REPORT: CPT | Performed by: INTERNAL MEDICINE

## 2024-12-07 PROCEDURE — 2580000003 HC RX 258

## 2024-12-07 PROCEDURE — 99223 1ST HOSP IP/OBS HIGH 75: CPT | Performed by: NUCLEAR MEDICINE

## 2024-12-07 PROCEDURE — 90935 HEMODIALYSIS ONE EVALUATION: CPT

## 2024-12-07 PROCEDURE — 0HBKXZZ EXCISION OF RIGHT LOWER LEG SKIN, EXTERNAL APPROACH: ICD-10-PCS | Performed by: INTERNAL MEDICINE

## 2024-12-07 PROCEDURE — 6370000000 HC RX 637 (ALT 250 FOR IP): Performed by: INTERNAL MEDICINE

## 2024-12-07 PROCEDURE — 85027 COMPLETE CBC AUTOMATED: CPT

## 2024-12-07 PROCEDURE — 6370000000 HC RX 637 (ALT 250 FOR IP)

## 2024-12-07 PROCEDURE — P9047 ALBUMIN (HUMAN), 25%, 50ML: HCPCS | Performed by: INTERNAL MEDICINE

## 2024-12-07 PROCEDURE — 2500000003 HC RX 250 WO HCPCS: Performed by: FAMILY MEDICINE

## 2024-12-07 PROCEDURE — 99233 SBSQ HOSP IP/OBS HIGH 50: CPT | Performed by: FAMILY MEDICINE

## 2024-12-07 PROCEDURE — 85520 HEPARIN ASSAY: CPT

## 2024-12-07 PROCEDURE — 80053 COMPREHEN METABOLIC PANEL: CPT

## 2024-12-07 PROCEDURE — 5A1D70Z PERFORMANCE OF URINARY FILTRATION, INTERMITTENT, LESS THAN 6 HOURS PER DAY: ICD-10-PCS | Performed by: INTERNAL MEDICINE

## 2024-12-07 PROCEDURE — 6360000002 HC RX W HCPCS: Performed by: INTERNAL MEDICINE

## 2024-12-07 PROCEDURE — 85610 PROTHROMBIN TIME: CPT

## 2024-12-07 PROCEDURE — 94640 AIRWAY INHALATION TREATMENT: CPT

## 2024-12-07 PROCEDURE — 6360000002 HC RX W HCPCS

## 2024-12-07 PROCEDURE — 6360000002 HC RX W HCPCS: Performed by: FAMILY MEDICINE

## 2024-12-07 PROCEDURE — 36415 COLL VENOUS BLD VENIPUNCTURE: CPT

## 2024-12-07 PROCEDURE — 80202 ASSAY OF VANCOMYCIN: CPT

## 2024-12-07 PROCEDURE — 2140000000 HC CCU INTERMEDIATE R&B

## 2024-12-07 PROCEDURE — 87340 HEPATITIS B SURFACE AG IA: CPT

## 2024-12-07 PROCEDURE — 94761 N-INVAS EAR/PLS OXIMETRY MLT: CPT

## 2024-12-07 PROCEDURE — 85025 COMPLETE CBC W/AUTO DIFF WBC: CPT

## 2024-12-07 RX ORDER — IPRATROPIUM BROMIDE AND ALBUTEROL SULFATE 2.5; .5 MG/3ML; MG/3ML
1 SOLUTION RESPIRATORY (INHALATION) 2 TIMES DAILY
Status: DISCONTINUED | OUTPATIENT
Start: 2024-12-07 | End: 2024-12-08

## 2024-12-07 RX ORDER — ALBUMIN (HUMAN) 12.5 G/50ML
25 SOLUTION INTRAVENOUS ONCE
Status: COMPLETED | OUTPATIENT
Start: 2024-12-07 | End: 2024-12-07

## 2024-12-07 RX ORDER — HEPARIN SODIUM 10000 [USP'U]/100ML
5-30 INJECTION, SOLUTION INTRAVENOUS CONTINUOUS
Status: DISCONTINUED | OUTPATIENT
Start: 2024-12-07 | End: 2024-12-11 | Stop reason: HOSPADM

## 2024-12-07 RX ORDER — METOPROLOL TARTRATE 1 MG/ML
2.5 INJECTION, SOLUTION INTRAVENOUS ONCE
Status: DISCONTINUED | OUTPATIENT
Start: 2024-12-07 | End: 2024-12-07

## 2024-12-07 RX ORDER — HEPARIN SODIUM 1000 [USP'U]/ML
4000 INJECTION, SOLUTION INTRAVENOUS; SUBCUTANEOUS PRN
Status: DISCONTINUED | OUTPATIENT
Start: 2024-12-07 | End: 2024-12-11 | Stop reason: HOSPADM

## 2024-12-07 RX ORDER — MIDODRINE HYDROCHLORIDE 2.5 MG/1
2.5 TABLET ORAL
Status: DISCONTINUED | OUTPATIENT
Start: 2024-12-07 | End: 2024-12-07

## 2024-12-07 RX ORDER — HEPARIN SODIUM 1000 [USP'U]/ML
2000 INJECTION, SOLUTION INTRAVENOUS; SUBCUTANEOUS PRN
Status: DISCONTINUED | OUTPATIENT
Start: 2024-12-07 | End: 2024-12-11 | Stop reason: HOSPADM

## 2024-12-07 RX ORDER — MIDODRINE HYDROCHLORIDE 10 MG/1
20 TABLET ORAL
Status: DISCONTINUED | OUTPATIENT
Start: 2024-12-07 | End: 2024-12-11 | Stop reason: HOSPADM

## 2024-12-07 RX ORDER — HEPARIN SODIUM 1000 [USP'U]/ML
4000 INJECTION, SOLUTION INTRAVENOUS; SUBCUTANEOUS ONCE
Status: COMPLETED | OUTPATIENT
Start: 2024-12-07 | End: 2024-12-07

## 2024-12-07 RX ADMIN — LEVOTHYROXINE SODIUM 50 MCG: 0.05 TABLET ORAL at 06:27

## 2024-12-07 RX ADMIN — IPRATROPIUM BROMIDE AND ALBUTEROL SULFATE 1 DOSE: .5; 3 SOLUTION RESPIRATORY (INHALATION) at 00:24

## 2024-12-07 RX ADMIN — AMIODARONE HYDROCHLORIDE 150 MG: 1.5 INJECTION, SOLUTION INTRAVENOUS at 10:57

## 2024-12-07 RX ADMIN — CETIRIZINE HYDROCHLORIDE 10 MG: 10 TABLET, FILM COATED ORAL at 08:03

## 2024-12-07 RX ADMIN — VANCOMYCIN HYDROCHLORIDE 1000 MG: 1 INJECTION, POWDER, LYOPHILIZED, FOR SOLUTION INTRAVENOUS at 11:08

## 2024-12-07 RX ADMIN — OXYCODONE 5 MG: 5 TABLET ORAL at 06:31

## 2024-12-07 RX ADMIN — IPRATROPIUM BROMIDE AND ALBUTEROL SULFATE 1 DOSE: .5; 3 SOLUTION RESPIRATORY (INHALATION) at 07:33

## 2024-12-07 RX ADMIN — HEPARIN SODIUM 4000 UNITS: 1000 INJECTION INTRAVENOUS; SUBCUTANEOUS at 10:53

## 2024-12-07 RX ADMIN — Medication 2000 UNITS: at 11:09

## 2024-12-07 RX ADMIN — IVABRADINE 2.5 MG: 5 TABLET, FILM COATED ORAL at 16:25

## 2024-12-07 RX ADMIN — IPRATROPIUM BROMIDE AND ALBUTEROL SULFATE 1 DOSE: .5; 3 SOLUTION RESPIRATORY (INHALATION) at 20:50

## 2024-12-07 RX ADMIN — MIDODRINE HYDROCHLORIDE 20 MG: 10 TABLET ORAL at 15:51

## 2024-12-07 RX ADMIN — AMIODARONE HYDROCHLORIDE 1 MG/MIN: 1.8 INJECTION, SOLUTION INTRAVENOUS at 16:41

## 2024-12-07 RX ADMIN — IVABRADINE 2.5 MG: 5 TABLET, FILM COATED ORAL at 08:04

## 2024-12-07 RX ADMIN — PANTOPRAZOLE SODIUM 40 MG: 40 TABLET, DELAYED RELEASE ORAL at 06:27

## 2024-12-07 RX ADMIN — MIDODRINE HYDROCHLORIDE 20 MG: 10 TABLET ORAL at 08:03

## 2024-12-07 RX ADMIN — SODIUM CHLORIDE, PRESERVATIVE FREE 10 ML: 5 INJECTION INTRAVENOUS at 08:06

## 2024-12-07 RX ADMIN — MIDODRINE HYDROCHLORIDE 20 MG: 10 TABLET ORAL at 20:42

## 2024-12-07 RX ADMIN — ALBUMIN (HUMAN) 25 G: 0.25 INJECTION, SOLUTION INTRAVENOUS at 12:38

## 2024-12-07 RX ADMIN — HEPARIN SODIUM 5000 UNITS: 5000 INJECTION INTRAVENOUS; SUBCUTANEOUS at 06:27

## 2024-12-07 RX ADMIN — AMIODARONE HYDROCHLORIDE 1 MG/MIN: 1.8 INJECTION, SOLUTION INTRAVENOUS at 11:07

## 2024-12-07 RX ADMIN — HEPARIN SODIUM 12 UNITS/KG/HR: 10000 INJECTION, SOLUTION INTRAVENOUS at 10:56

## 2024-12-07 ASSESSMENT — PAIN SCALES - GENERAL: PAINLEVEL_OUTOF10: 3

## 2024-12-07 NOTE — PLAN OF CARE
Problem: Discharge Planning  Goal: Discharge to home or other facility with appropriate resources  12/7/2024 0520 by Adela Hutchinson RN  Outcome: Progressing  Flowsheets (Taken 12/6/2024 1735 by Amy Olvera RN)  Discharge to home or other facility with appropriate resources: Identify barriers to discharge with patient and caregiver  12/6/2024 1734 by Amy Olvera RN  Outcome: Progressing  Flowsheets (Taken 12/6/2024 1712)  Discharge to home or other facility with appropriate resources:   Identify barriers to discharge with patient and caregiver   Arrange for interpreters to assist at discharge as needed     Problem: Safety - Adult  Goal: Free from fall injury  12/7/2024 0520 by Adela Hutchinson RN  Outcome: Progressing  Flowsheets (Taken 12/7/2024 0520)  Free From Fall Injury:   Instruct family/caregiver on patient safety   Based on caregiver fall risk screen, instruct family/caregiver to ask for assistance with transferring infant if caregiver noted to have fall risk factors  12/6/2024 1734 by Amy Olvera RN  Outcome: Progressing     Problem: Skin/Tissue Integrity  Goal: Absence of new skin breakdown  Description: 1.  Monitor for areas of redness and/or skin breakdown  2.  Assess vascular access sites hourly  3.  Every 4-6 hours minimum:  Change oxygen saturation probe site  4.  Every 4-6 hours:  If on nasal continuous positive airway pressure, respiratory therapy assess nares and determine need for appliance change or resting period.  12/7/2024 0520 by Adela Hutchinson RN  Outcome: Progressing  12/6/2024 1734 by Amy Olvera RN  Outcome: Progressing     Problem: Pain  Goal: Verbalizes/displays adequate comfort level or baseline comfort level  12/7/2024 0520 by Adela Hutchinson RN  Outcome: Progressing  Flowsheets (Taken 12/7/2024 0520)  Verbalizes/displays adequate comfort level or baseline comfort level:   Encourage patient to monitor pain and request assistance

## 2024-12-07 NOTE — FLOWSHEET NOTE
Stable 3 hour TX completed. Removed 1 L of fluid as ordered. Gave albumin 25g as ordered, tolerated well. Bilateral cath ports flushed with normal saline, clamped, and secured with tegos. CVC drsg clean, dry, and intact. Report called to primary RN. TX record printed for scanning into EMR.   12/07/24 1228 12/07/24 1547   Vital Signs   BP (!) 109/43 (!) 105/50   Temp 97.6 °F (36.4 °C) 97.6 °F (36.4 °C)   Pulse (!) 125 (!) 105   Respirations 26 20   SpO2 96 % 99 %   Weight - Scale 71.5 kg (157 lb 10.1 oz) 70.5 kg (155 lb 6.8 oz)   Weight Method  --  Bed scale   Percent Weight Change 0.7 -1.4   Post-Hemodialysis Assessment   Post-Treatment Procedures  --  Blood returned;Catheter Capped, clamped with Saline x2 ports   Machine Disinfection Process  --  Acid/Vinegar Clean;Heat Disinfect;Exterior Machine Disinfection   Blood Volume Processed (Liters)  --  56 L   Dialyzer Clearance  --  Lightly streaked   Duration of Treatment (minutes)  --  180 minutes   Heparin Amount Administered During Treatment (mL)  --  0 mL   Hemodialysis Intake (ml)  --  400 ml   Hemodialysis Output (ml)  --  1400 ml   NET Removed (ml)  --  1000

## 2024-12-07 NOTE — CARE COORDINATION
12/07/24 0849   Service Assessment   Patient Orientation Alert and Oriented   Cognition Alert   History Provided By Patient   Primary Caregiver Self   Support Systems Children   Patient's Healthcare Decision Maker is: Legal Next of Kin   PCP Verified by CM Yes   Prior Functional Level Assistance with the following:;Cooking   Current Functional Level Assistance with the following:;Mobility   Can patient return to prior living arrangement Unknown at present   Ability to make needs known: Good   Family able to assist with home care needs: Yes   Would you like for me to discuss the discharge plan with any other family members/significant others, and if so, who? No   Financial Resources Medicare;Medicaid   Community Resources Transportation  ([facility] takes to HD)   Social/Functional History   Active  No   Patient's  Info COA   Discharge Planning   Type of Residence House   Living Arrangements Children  (daughter Mariaa)   Current Services Prior To Admission Durable Medical Equipment;C-pap;Other (Comment)  (MWF 0900 HD at [facility])   Current DME Prior to Arrival Walker;Cane;Cpap   Potential Assistance Needed Home Care;Outpatient PT/OT;Skilled Nursing Facility   DME Ordered? No   Potential Assistance Purchasing Medications No   Type of Home Care Services None   Patient expects to be discharged to: House   Condition of Participation: Discharge Planning   The Plan for Transition of Care is related to the following treatment goals: leg to feel better     Patient Goals/Plan/Treatment Preferences: Met with Alisha, she is from home with her daughter Mariaa. She is a MWF dialysis patient at [facility]. DME as below. At this time she is not certain of discharge needs, states she is waiting for physician's input and to see how her leg feels.     If patient is discharged prior to next notation, then this note serves as note for discharge by case management.

## 2024-12-08 PROBLEM — G89.4 CHRONIC PAIN SYNDROME: Status: ACTIVE | Noted: 2024-12-08

## 2024-12-08 PROBLEM — E03.9 HYPOTHYROIDISM: Status: ACTIVE | Noted: 2024-12-08

## 2024-12-08 PROBLEM — J44.9 COPD WITHOUT EXACERBATION (HCC): Status: ACTIVE | Noted: 2024-12-08

## 2024-12-08 PROBLEM — I50.22 CHRONIC HEART FAILURE WITH MILDLY REDUCED EJECTION FRACTION (HFMREF, 41-49%) (HCC): Status: ACTIVE | Noted: 2024-12-08

## 2024-12-08 PROBLEM — I48.20 CHRONIC ATRIAL FIBRILLATION WITH RVR (HCC): Status: ACTIVE | Noted: 2024-12-08

## 2024-12-08 PROBLEM — S81.801A TRAUMATIC OPEN WOUND OF LOWER LEG, RIGHT, INITIAL ENCOUNTER: Status: ACTIVE | Noted: 2024-12-08

## 2024-12-08 PROBLEM — K74.69 OTHER CIRRHOSIS OF LIVER (HCC): Status: ACTIVE | Noted: 2024-12-08

## 2024-12-08 PROBLEM — K21.9 GASTROESOPHAGEAL REFLUX DISEASE: Status: ACTIVE | Noted: 2024-12-08

## 2024-12-08 PROBLEM — Z99.2 ESRD ON HEMODIALYSIS (HCC): Status: ACTIVE | Noted: 2024-05-20

## 2024-12-08 PROBLEM — I07.1 MILD TRICUSPID REGURGITATION: Status: ACTIVE | Noted: 2024-12-08

## 2024-12-08 PROBLEM — I34.0 MILD MITRAL REGURGITATION: Status: ACTIVE | Noted: 2024-12-08

## 2024-12-08 PROBLEM — R53.1 GENERALIZED WEAKNESS: Status: ACTIVE | Noted: 2024-12-08

## 2024-12-08 PROBLEM — R79.89 ELEVATED TROPONIN: Status: ACTIVE | Noted: 2024-12-08

## 2024-12-08 PROBLEM — R74.8 ELEVATED ALKALINE PHOSPHATASE LEVEL: Status: ACTIVE | Noted: 2024-12-08

## 2024-12-08 PROBLEM — D64.9 CHRONIC ANEMIA: Status: ACTIVE | Noted: 2024-12-08

## 2024-12-08 LAB
ALBUMIN SERPL BCG-MCNC: 3.8 G/DL (ref 3.5–5.1)
ALP SERPL-CCNC: 296 U/L (ref 38–126)
ALT SERPL W/O P-5'-P-CCNC: 19 U/L (ref 11–66)
ANION GAP SERPL CALC-SCNC: 17 MEQ/L (ref 8–16)
AST SERPL-CCNC: 37 U/L (ref 5–40)
BASOPHILS ABSOLUTE: 0 THOU/MM3 (ref 0–0.1)
BASOPHILS NFR BLD AUTO: 0.4 %
BILIRUB SERPL-MCNC: 0.3 MG/DL (ref 0.3–1.2)
BUN SERPL-MCNC: 17 MG/DL (ref 7–22)
CALCIUM SERPL-MCNC: 9.6 MG/DL (ref 8.5–10.5)
CHLORIDE SERPL-SCNC: 91 MEQ/L (ref 98–111)
CO2 SERPL-SCNC: 25 MEQ/L (ref 23–33)
CREAT SERPL-MCNC: 4.1 MG/DL (ref 0.4–1.2)
DEPRECATED RDW RBC AUTO: 47.6 FL (ref 35–45)
EOSINOPHIL NFR BLD AUTO: 3.2 %
EOSINOPHILS ABSOLUTE: 0.2 THOU/MM3 (ref 0–0.4)
ERYTHROCYTE [DISTWIDTH] IN BLOOD BY AUTOMATED COUNT: 13.7 % (ref 11.5–14.5)
GFR SERPL CREATININE-BSD FRML MDRD: 10 ML/MIN/1.73M2
GLUCOSE SERPL-MCNC: 87 MG/DL (ref 70–108)
HCT VFR BLD AUTO: 29.3 % (ref 37–47)
HEPARIN UNFRACTIONATED: 0.21 U/ML (ref 0.3–0.7)
HEPARIN UNFRACTIONATED: 0.27 U/ML (ref 0.3–0.7)
HEPARIN UNFRACTIONATED: 0.29 U/ML (ref 0.3–0.7)
HGB BLD-MCNC: 9.4 GM/DL (ref 12–16)
IMM GRANULOCYTES # BLD AUTO: 0.02 THOU/MM3 (ref 0–0.07)
IMM GRANULOCYTES NFR BLD AUTO: 0.3 %
LYMPHOCYTES ABSOLUTE: 1.9 THOU/MM3 (ref 1–4.8)
LYMPHOCYTES NFR BLD AUTO: 26 %
MCH RBC QN AUTO: 30.4 PG (ref 26–33)
MCHC RBC AUTO-ENTMCNC: 32.1 GM/DL (ref 32.2–35.5)
MCV RBC AUTO: 94.8 FL (ref 81–99)
MONOCYTES ABSOLUTE: 1.1 THOU/MM3 (ref 0.4–1.3)
MONOCYTES NFR BLD AUTO: 15.1 %
NEUTROPHILS ABSOLUTE: 4 THOU/MM3 (ref 1.8–7.7)
NEUTROPHILS NFR BLD AUTO: 55 %
NRBC BLD AUTO-RTO: 0 /100 WBC
PLATELET # BLD AUTO: 250 THOU/MM3 (ref 130–400)
PMV BLD AUTO: 9.3 FL (ref 9.4–12.4)
POTASSIUM SERPL-SCNC: 4.4 MEQ/L (ref 3.5–5.2)
PROT SERPL-MCNC: 6.6 G/DL (ref 6.1–8)
RBC # BLD AUTO: 3.09 MILL/MM3 (ref 4.2–5.4)
SODIUM SERPL-SCNC: 133 MEQ/L (ref 135–145)
WBC # BLD AUTO: 7.2 THOU/MM3 (ref 4.8–10.8)

## 2024-12-08 PROCEDURE — 94761 N-INVAS EAR/PLS OXIMETRY MLT: CPT

## 2024-12-08 PROCEDURE — 85025 COMPLETE CBC W/AUTO DIFF WBC: CPT

## 2024-12-08 PROCEDURE — 6360000002 HC RX W HCPCS

## 2024-12-08 PROCEDURE — 2140000000 HC CCU INTERMEDIATE R&B

## 2024-12-08 PROCEDURE — 99233 SBSQ HOSP IP/OBS HIGH 50: CPT | Performed by: FAMILY MEDICINE

## 2024-12-08 PROCEDURE — 6370000000 HC RX 637 (ALT 250 FOR IP)

## 2024-12-08 PROCEDURE — 6370000000 HC RX 637 (ALT 250 FOR IP): Performed by: INTERNAL MEDICINE

## 2024-12-08 PROCEDURE — 94640 AIRWAY INHALATION TREATMENT: CPT

## 2024-12-08 PROCEDURE — 85520 HEPARIN ASSAY: CPT

## 2024-12-08 PROCEDURE — 80053 COMPREHEN METABOLIC PANEL: CPT

## 2024-12-08 PROCEDURE — 36415 COLL VENOUS BLD VENIPUNCTURE: CPT

## 2024-12-08 PROCEDURE — 2500000003 HC RX 250 WO HCPCS: Performed by: FAMILY MEDICINE

## 2024-12-08 RX ORDER — IPRATROPIUM BROMIDE AND ALBUTEROL SULFATE 2.5; .5 MG/3ML; MG/3ML
1 SOLUTION RESPIRATORY (INHALATION) EVERY 4 HOURS PRN
Status: DISCONTINUED | OUTPATIENT
Start: 2024-12-08 | End: 2024-12-11 | Stop reason: HOSPADM

## 2024-12-08 RX ADMIN — HEPARIN SODIUM 2000 UNITS: 1000 INJECTION INTRAVENOUS; SUBCUTANEOUS at 18:12

## 2024-12-08 RX ADMIN — IVABRADINE 2.5 MG: 5 TABLET, FILM COATED ORAL at 17:15

## 2024-12-08 RX ADMIN — IPRATROPIUM BROMIDE AND ALBUTEROL SULFATE 1 DOSE: .5; 3 SOLUTION RESPIRATORY (INHALATION) at 08:04

## 2024-12-08 RX ADMIN — PANTOPRAZOLE SODIUM 40 MG: 40 TABLET, DELAYED RELEASE ORAL at 06:18

## 2024-12-08 RX ADMIN — HEPARIN SODIUM 2000 UNITS: 1000 INJECTION INTRAVENOUS; SUBCUTANEOUS at 09:36

## 2024-12-08 RX ADMIN — HEPARIN SODIUM 16 UNITS/KG/HR: 10000 INJECTION, SOLUTION INTRAVENOUS at 14:59

## 2024-12-08 RX ADMIN — LEVOTHYROXINE SODIUM 50 MCG: 0.05 TABLET ORAL at 06:18

## 2024-12-08 RX ADMIN — MIDODRINE HYDROCHLORIDE 20 MG: 10 TABLET ORAL at 06:18

## 2024-12-08 RX ADMIN — Medication 2000 UNITS: at 08:18

## 2024-12-08 RX ADMIN — IVABRADINE 2.5 MG: 5 TABLET, FILM COATED ORAL at 08:19

## 2024-12-08 RX ADMIN — CETIRIZINE HYDROCHLORIDE 10 MG: 10 TABLET, FILM COATED ORAL at 08:18

## 2024-12-08 RX ADMIN — AMIODARONE HYDROCHLORIDE 0.5 MG/MIN: 1.8 INJECTION, SOLUTION INTRAVENOUS at 16:36

## 2024-12-08 RX ADMIN — MIDODRINE HYDROCHLORIDE 20 MG: 10 TABLET ORAL at 14:17

## 2024-12-08 RX ADMIN — HEPARIN SODIUM 2000 UNITS: 1000 INJECTION INTRAVENOUS; SUBCUTANEOUS at 00:34

## 2024-12-08 RX ADMIN — AMIODARONE HYDROCHLORIDE 0.5 MG/MIN: 1.8 INJECTION, SOLUTION INTRAVENOUS at 04:02

## 2024-12-08 RX ADMIN — MIDODRINE HYDROCHLORIDE 20 MG: 10 TABLET ORAL at 20:15

## 2024-12-08 NOTE — PLAN OF CARE
Problem: Discharge Planning  Goal: Discharge to home or other facility with appropriate resources  Outcome: Progressing  Flowsheets (Taken 12/6/2024 1735 by Amy Olvera RN)  Discharge to home or other facility with appropriate resources: Identify barriers to discharge with patient and caregiver     Problem: Safety - Adult  Goal: Free from fall injury  Outcome: Progressing  Flowsheets (Taken 12/7/2024 0520)  Free From Fall Injury:   Instruct family/caregiver on patient safety   Based on caregiver fall risk screen, instruct family/caregiver to ask for assistance with transferring infant if caregiver noted to have fall risk factors     Problem: Skin/Tissue Integrity  Goal: Absence of new skin breakdown  Description: 1.  Monitor for areas of redness and/or skin breakdown  2.  Assess vascular access sites hourly  3.  Every 4-6 hours minimum:  Change oxygen saturation probe site  4.  Every 4-6 hours:  If on nasal continuous positive airway pressure, respiratory therapy assess nares and determine need for appliance change or resting period.  Outcome: Progressing     Problem: Pain  Goal: Verbalizes/displays adequate comfort level or baseline comfort level  Outcome: Progressing  Flowsheets (Taken 12/7/2024 0520)  Verbalizes/displays adequate comfort level or baseline comfort level:   Encourage patient to monitor pain and request assistance   Assess pain using appropriate pain scale   Administer analgesics based on type and severity of pain and evaluate response   Implement non-pharmacological measures as appropriate and evaluate response   Consider cultural and social influences on pain and pain management   Notify Licensed Independent Practitioner if interventions unsuccessful or patient reports new pain   Care plan reviewed with patient.  Patient verbalizes understanding of the care plan and contributed to goal setting.

## 2024-12-08 NOTE — RT PROTOCOL NOTE
RT Inhaler-Nebulizer Bronchodilator Protocol Note    There is a bronchodilator order in the chart from a provider indicating to follow the RT Bronchodilator Protocol and there is an “Initiate RT Inhaler-Nebulizer Bronchodilator Protocol” order as well (see protocol at bottom of note).    CXR Findings:  No results found.    The findings from the last RT Protocol Assessment were as follows:   History Pulmonary Disease: Chronic pulmonary disease  Respiratory Pattern: Dyspnea on exertion or RR 21-25 bpm  Breath Sounds: Clear breath sounds  Cough: Strong, spontaneous, non-productive  Indication for Bronchodilator Therapy: On home bronchodilators (takes 2 times a day)  Bronchodilator Assessment Score: 4    Aerosolized bronchodilator medication orders have been revised according to the RT Inhaler-Nebulizer Bronchodilator Protocol below.    Respiratory Therapist to perform RT Therapy Protocol Assessment initially then follow the protocol.  Repeat RT Therapy Protocol Assessment PRN for score 0-3 or on second treatment, BID, and PRN for scores above 3.    No Indications - adjust the frequency to every 6 hours PRN wheezing or bronchospasm, if no treatments needed after 48 hours then discontinue using Per Protocol order mode.     If indication present, adjust the RT bronchodilator orders based on the Bronchodilator Assessment Score as indicated below.  Use Inhaler orders unless patient has one or more of the following: on home nebulizer, not able to hold breath for 10 seconds, is not alert and oriented, cannot activate and use MDI correctly, or respiratory rate 25 breaths per minute or more, then use the equivalent nebulizer order(s) with same Frequency and PRN reasons based on the score.  If a patient is on this medication at home then do not decrease Frequency below that used at home.    0-3 - enter or revise RT bronchodilator order(s) to equivalent RT Bronchodilator order with Frequency of every 4 hours PRN for wheezing or 
RT Inhaler-Nebulizer Bronchodilator Protocol Note    There is a bronchodilator order in the chart from a provider indicating to follow the RT Bronchodilator Protocol and there is an “Initiate RT Inhaler-Nebulizer Bronchodilator Protocol” order as well (see protocol at bottom of note).    CXR Findings:  No results found.    The findings from the last RT Protocol Assessment were as follows:   History Pulmonary Disease: Chronic pulmonary disease  Respiratory Pattern: Regular pattern and RR 12-20 bpm  Breath Sounds: Clear breath sounds  Cough: Strong, spontaneous, non-productive  Indication for Bronchodilator Therapy: On home bronchodilators (Pt states, \"I take an inhaler just as needed at home.\")  Bronchodilator Assessment Score: 2    Aerosolized bronchodilator medication orders have been revised according to the RT Inhaler-Nebulizer Bronchodilator Protocol below.    Respiratory Therapist to perform RT Therapy Protocol Assessment initially then follow the protocol.  Repeat RT Therapy Protocol Assessment PRN for score 0-3 or on second treatment, BID, and PRN for scores above 3.    No Indications - adjust the frequency to every 6 hours PRN wheezing or bronchospasm, if no treatments needed after 48 hours then discontinue using Per Protocol order mode.     If indication present, adjust the RT bronchodilator orders based on the Bronchodilator Assessment Score as indicated below.  Use Inhaler orders unless patient has one or more of the following: on home nebulizer, not able to hold breath for 10 seconds, is not alert and oriented, cannot activate and use MDI correctly, or respiratory rate 25 breaths per minute or more, then use the equivalent nebulizer order(s) with same Frequency and PRN reasons based on the score.  If a patient is on this medication at home then do not decrease Frequency below that used at home.    0-3 - enter or revise RT bronchodilator order(s) to equivalent RT Bronchodilator order with Frequency of 
order mode.        4-6 - enter or revise RT Bronchodilator order(s) to two equivalent RT bronchodilator orders with one order with BID Frequency and one order with Frequency of every 4 hours PRN wheezing or increased work of breathing using Per Protocol order mode.        7-10 - enter or revise RT Bronchodilator order(s) to two equivalent RT bronchodilator orders with one order with TID Frequency and one order with Frequency of every 4 hours PRN wheezing or increased work of breathing using Per Protocol order mode.       11-13 - enter or revise RT Bronchodilator order(s) to one equivalent RT bronchodilator order with QID Frequency and an Albuterol order with Frequency of every 4 hours PRN wheezing or increased work of breathing using Per Protocol order mode.      Greater than 13 - enter or revise RT Bronchodilator order(s) to one equivalent RT bronchodilator order with every 4 hours Frequency and an Albuterol order with Frequency of every 2 hours PRN wheezing or increased work of breathing using Per Protocol order mode.     RT to enter RT Home Evaluation for COPD & MDI Assessment order using Per Protocol order mode.    Electronically signed by Mk Aceves RCP on 12/7/2024 at 2:34 AM

## 2024-12-09 LAB
ALBUMIN SERPL BCG-MCNC: 3.5 G/DL (ref 3.5–5.1)
ALP SERPL-CCNC: 305 U/L (ref 38–126)
ALT SERPL W/O P-5'-P-CCNC: 18 U/L (ref 11–66)
ANION GAP SERPL CALC-SCNC: 17 MEQ/L (ref 8–16)
AST SERPL-CCNC: 32 U/L (ref 5–40)
BASOPHILS ABSOLUTE: 0 THOU/MM3 (ref 0–0.1)
BASOPHILS NFR BLD AUTO: 0.3 %
BILIRUB SERPL-MCNC: 0.3 MG/DL (ref 0.3–1.2)
BUN SERPL-MCNC: 25 MG/DL (ref 7–22)
CALCIUM SERPL-MCNC: 9.5 MG/DL (ref 8.5–10.5)
CHLORIDE SERPL-SCNC: 89 MEQ/L (ref 98–111)
CO2 SERPL-SCNC: 21 MEQ/L (ref 23–33)
CREAT SERPL-MCNC: 5.1 MG/DL (ref 0.4–1.2)
DEPRECATED RDW RBC AUTO: 51.7 FL (ref 35–45)
EOSINOPHIL NFR BLD AUTO: 3.1 %
EOSINOPHILS ABSOLUTE: 0.2 THOU/MM3 (ref 0–0.4)
ERYTHROCYTE [DISTWIDTH] IN BLOOD BY AUTOMATED COUNT: 13.9 % (ref 11.5–14.5)
GFR SERPL CREATININE-BSD FRML MDRD: 8 ML/MIN/1.73M2
GLUCOSE SERPL-MCNC: 91 MG/DL (ref 70–108)
HCT VFR BLD AUTO: 31.2 % (ref 37–47)
HEPARIN UNFRACTIONATED: 0.28 U/ML (ref 0.3–0.7)
HEPARIN UNFRACTIONATED: 0.28 U/ML (ref 0.3–0.7)
HEPARIN UNFRACTIONATED: 0.33 U/ML (ref 0.3–0.7)
HGB BLD-MCNC: 9.4 GM/DL (ref 12–16)
IMM GRANULOCYTES # BLD AUTO: 0.02 THOU/MM3 (ref 0–0.07)
IMM GRANULOCYTES NFR BLD AUTO: 0.3 %
LYMPHOCYTES ABSOLUTE: 0.9 THOU/MM3 (ref 1–4.8)
LYMPHOCYTES NFR BLD AUTO: 11.9 %
MCH RBC QN AUTO: 30.7 PG (ref 26–33)
MCHC RBC AUTO-ENTMCNC: 30.1 GM/DL (ref 32.2–35.5)
MCV RBC AUTO: 102 FL (ref 81–99)
MONOCYTES ABSOLUTE: 1 THOU/MM3 (ref 0.4–1.3)
MONOCYTES NFR BLD AUTO: 12.8 %
NEUTROPHILS ABSOLUTE: 5.6 THOU/MM3 (ref 1.8–7.7)
NEUTROPHILS NFR BLD AUTO: 71.6 %
NRBC BLD AUTO-RTO: 0 /100 WBC
PLATELET # BLD AUTO: 258 THOU/MM3 (ref 130–400)
PMV BLD AUTO: 9.3 FL (ref 9.4–12.4)
POTASSIUM SERPL-SCNC: 4.7 MEQ/L (ref 3.5–5.2)
PROT SERPL-MCNC: 6.4 G/DL (ref 6.1–8)
RBC # BLD AUTO: 3.06 MILL/MM3 (ref 4.2–5.4)
SODIUM SERPL-SCNC: 127 MEQ/L (ref 135–145)
WBC # BLD AUTO: 7.8 THOU/MM3 (ref 4.8–10.8)

## 2024-12-09 PROCEDURE — 6370000000 HC RX 637 (ALT 250 FOR IP)

## 2024-12-09 PROCEDURE — 6370000000 HC RX 637 (ALT 250 FOR IP): Performed by: PHYSICIAN ASSISTANT

## 2024-12-09 PROCEDURE — 99232 SBSQ HOSP IP/OBS MODERATE 35: CPT | Performed by: PHYSICIAN ASSISTANT

## 2024-12-09 PROCEDURE — 6360000002 HC RX W HCPCS

## 2024-12-09 PROCEDURE — 6370000000 HC RX 637 (ALT 250 FOR IP): Performed by: INTERNAL MEDICINE

## 2024-12-09 PROCEDURE — 80053 COMPREHEN METABOLIC PANEL: CPT

## 2024-12-09 PROCEDURE — 85520 HEPARIN ASSAY: CPT

## 2024-12-09 PROCEDURE — 36415 COLL VENOUS BLD VENIPUNCTURE: CPT

## 2024-12-09 PROCEDURE — 97535 SELF CARE MNGMENT TRAINING: CPT

## 2024-12-09 PROCEDURE — 97166 OT EVAL MOD COMPLEX 45 MIN: CPT

## 2024-12-09 PROCEDURE — 99232 SBSQ HOSP IP/OBS MODERATE 35: CPT | Performed by: FAMILY MEDICINE

## 2024-12-09 PROCEDURE — 2500000003 HC RX 250 WO HCPCS: Performed by: FAMILY MEDICINE

## 2024-12-09 PROCEDURE — 90935 HEMODIALYSIS ONE EVALUATION: CPT

## 2024-12-09 PROCEDURE — 2140000000 HC CCU INTERMEDIATE R&B

## 2024-12-09 PROCEDURE — 85025 COMPLETE CBC W/AUTO DIFF WBC: CPT

## 2024-12-09 RX ORDER — AMIODARONE HYDROCHLORIDE 200 MG/1
200 TABLET ORAL DAILY
Status: DISCONTINUED | OUTPATIENT
Start: 2024-12-09 | End: 2024-12-11 | Stop reason: HOSPADM

## 2024-12-09 RX ADMIN — OXYCODONE 5 MG: 5 TABLET ORAL at 07:02

## 2024-12-09 RX ADMIN — HEPARIN SODIUM 2000 UNITS: 1000 INJECTION INTRAVENOUS; SUBCUTANEOUS at 08:19

## 2024-12-09 RX ADMIN — PANTOPRAZOLE SODIUM 40 MG: 40 TABLET, DELAYED RELEASE ORAL at 06:44

## 2024-12-09 RX ADMIN — Medication 2000 UNITS: at 12:48

## 2024-12-09 RX ADMIN — LEVOTHYROXINE SODIUM 50 MCG: 0.05 TABLET ORAL at 06:44

## 2024-12-09 RX ADMIN — CETIRIZINE HYDROCHLORIDE 10 MG: 10 TABLET, FILM COATED ORAL at 12:49

## 2024-12-09 RX ADMIN — HEPARIN SODIUM 20 UNITS/KG/HR: 10000 INJECTION, SOLUTION INTRAVENOUS at 15:50

## 2024-12-09 RX ADMIN — AMIODARONE HYDROCHLORIDE 200 MG: 200 TABLET ORAL at 16:11

## 2024-12-09 RX ADMIN — Medication 3 MG: at 19:48

## 2024-12-09 RX ADMIN — HEPARIN SODIUM 2000 UNITS: 1000 INJECTION INTRAVENOUS; SUBCUTANEOUS at 22:14

## 2024-12-09 RX ADMIN — MIDODRINE HYDROCHLORIDE 20 MG: 10 TABLET ORAL at 06:44

## 2024-12-09 RX ADMIN — MIDODRINE HYDROCHLORIDE 20 MG: 10 TABLET ORAL at 15:01

## 2024-12-09 RX ADMIN — MIDODRINE HYDROCHLORIDE 20 MG: 10 TABLET ORAL at 19:58

## 2024-12-09 RX ADMIN — AMIODARONE HYDROCHLORIDE 0.5 MG/MIN: 1.8 INJECTION, SOLUTION INTRAVENOUS at 03:52

## 2024-12-09 ASSESSMENT — PAIN DESCRIPTION - ONSET: ONSET: ON-GOING

## 2024-12-09 ASSESSMENT — PAIN DESCRIPTION - PAIN TYPE: TYPE: ACUTE PAIN

## 2024-12-09 ASSESSMENT — PAIN DESCRIPTION - LOCATION: LOCATION: LEG

## 2024-12-09 ASSESSMENT — PAIN - FUNCTIONAL ASSESSMENT: PAIN_FUNCTIONAL_ASSESSMENT: PREVENTS OR INTERFERES SOME ACTIVE ACTIVITIES AND ADLS

## 2024-12-09 ASSESSMENT — PAIN SCALES - GENERAL: PAINLEVEL_OUTOF10: 3

## 2024-12-09 ASSESSMENT — PAIN DESCRIPTION - DESCRIPTORS: DESCRIPTORS: ACHING

## 2024-12-09 ASSESSMENT — PAIN DESCRIPTION - FREQUENCY: FREQUENCY: INTERMITTENT

## 2024-12-09 ASSESSMENT — PAIN DESCRIPTION - ORIENTATION: ORIENTATION: RIGHT

## 2024-12-09 NOTE — CARE COORDINATION
Case Management Assessment Initial Evaluation    Date/Time of Evaluation: 12/9/2024 1:47 PM  Assessment Completed by: Rashmi Avendano RN    If patient is discharged prior to next notation, then this note serves as note for discharge by case management.    Patient Name: Alisha Armas                   YOB: 1945  Diagnosis: Atrial fibrillation with RVR (HCC) [I48.91]                   Date / Time: 12/6/2024  4:44 PM  Location: [address] [place] [address]     Patient Admission Status: Inpatient   Readmission Risk Low 0-14, Mod 15-19), High > 20: Readmission Risk Score: 17.6    Current PCP: Rupal Thomas MD  Health Care Decision Makers:   Primary Decision Maker: obdulio conner - Child - [phone]    Additional Case Management Notes: Transferred from [facility] with lightheadedness with hypotension at dialysis. Did not receive dialysis prior to transfer. Found to be in afib with RVR, have a wound on lower extremity. Consulted Nephrology, ID, Podiatry and Cardiology. PT/OT consulted. Limited code. BUN 25, creatinine 5.1. Dialysis today. Remains in afib, controlled ventricular rate. Amiodarone gtt. Heparin gtt.     Procedures: None    Imaging: None    Patient Goals/Plan/Treatment Preferences: Spoke with Alisha, she lives with her daughter. [facility] with [facility], MWF. Awaiting therapy recommendations.

## 2024-12-09 NOTE — CONSULTS
CONSULTATION NOTE :ID       Patient - Alisha Armas,  Age - 79 y.o.    - 1945      Room Number - 3B-30/030-A   MRN -  [id number]   [facility] # - [id number]  Date of Admission -  2024  4:44 PM  Patient's PCP: Rupal Thomas MD     Requesting Physician: Shae Medellin MD    REASON FOR CONSULTATION   Right leg traumaic wound  CHIEF COMPLAINT   dizziness    HISTORY OF PRESENT ILLNESS       This is a very pleasant 79 y.o. female who was admitted to the hospital with a chief complaints of dizziness. She has end stage renal disease on HD presented with light headedness. ID service was asked to see patient to assist with antibiotic treatment for right lower leg wound. She reports of fall with degloving injury of he right lower leg shin. Had the skin sutured, unfortunately the skin got necrotic and was debrided by podiatry. She denies any purulent drainage, denies any fever or chills. She has been on iv antibiotics. The fall happened over a wk ago. She was seen at HD unit. She has bruising of the skin    PAST MEDICAL  HISTORY       Past Medical History:   Diagnosis Date    COPD (chronic obstructive pulmonary disease) (HCC)     Hemodialysis patient (HCC)     Hypertension        PAST SURGICAL HISTORY     History reviewed. No pertinent surgical history.      MEDICATIONS:       Scheduled Meds:   ipratropium 0.5 mg-albuterol 2.5 mg  1 Dose Inhalation BID    midodrine  20 mg Oral 3 times per day    albumin human 25%  25 g IntraVENous Once    midodrine  2.5 mg Oral Once in dialysis    sodium chloride flush  5-40 mL IntraVENous 2 times per day    cefTRIAXone (ROCEPHIN) IV  1,000 mg IntraVENous Q24H    vancomycin (VANCOCIN) intermittent dosing (placeholder)   Other RX Placeholder    Vitamin D  2,000 Units Oral Daily    ivabradine  2.5 mg Oral BID WC    levothyroxine  50 mcg Oral Daily    macitentan  10 mg Oral Daily    pantoprazole  40 mg Oral QAM AC    selexipag  800 tablet Oral 
                    [place] [id number]                              CONSULTATION      PATIENT NAME: GERARDO WELSH          : 1945  MED REC NO: [id number]                       ROOM: [facility]  ACCOUNT NO: [id number]                       ADMIT DATE: 2024  PROVIDER: Marie Sinha MD    CARDIOLOGY CONSULT    CONSULT DATE: 2024    REFERRING PHYSICIAN:  Hospitalist Service      REASON FOR CONSULTATION:  Atrial fibrillation with rapid ventricular response.    HISTORY OF PRESENT ILLNESS:  This is a pleasant 79-year-old lady, who apparently follows with a local cardiologist, Dr. Dominique.  She does have history of renal failure, on dialysis for the last couple of years; history of atrial fibrillation that has failed rhythm control and has been with the rate controlled for a while.  Comes in with some lower extremity cellulitis, and during the workup, she was noted to have hypotension and tachycardia.  Heart rate has been running in the 140-150 range.  The patient is a dialysis patient and follows with Nephrology.  She denies any active chest pain.  She denies any knowledge of any significant coronary artery disease, at least from what I could tell.  Denies any recent intervention or catheterization.    REVIEW OF SYSTEMS:  1. No fever, no chills, no weight loss.  2. No hematuria or dysuria.  3. No abdominal pain, nausea, vomiting, or diarrhea.  4. No obvious active psych problems or suicidal ideation.  5. No skin rashes.  6. The patient has had some intermittent dizziness and lightheadedness with no syncope.  7. No recent trauma.  8. No bleeding problem.  9. No HEENT related problems.    PAST MEDICAL HISTORY:    1. Atrial fibrillation.  2. Renal failure.  3. Hypertension.  4. Hyperlipidemia.  5. Arthritis.    ALLERGIES:  NO KNOWN DRUG ALLERGIES.      CURRENT MEDICATIONS:  Included IV antibiotics, heparin, Corlanor, levothyroxine, metoprolol 
Please see original consult note by Dr Sinha 12/7/24.    Electronically Signed by  Miguel Nixon DO, MBA  PGY-2 Internal Medicine Resident  [facility]  On 12/9/2024 at 6:14 AM  
Results   Component Value Date/Time    LACTA 1.3 12/06/2024 06:07 PM      Amylase: No results found for: \"AMYLASE\"   Lipase: No results found for: \"LIPASE\" CBC:   Recent Labs     12/06/24  1807 12/07/24  0753 12/07/24  1007   WBC 7.6 6.6 6.4   HGB 10.1* 9.8* 9.7*    268 250     Calcium:  Recent Labs     12/07/24  0753   CALCIUM 9.4     Ionized Calcium:Invalid input(s): \"IONCA\"  Magnesium:  Recent Labs     12/06/24  1808   MG 2.0     Phosphorus:No results for input(s): \"PHOS\" in the last 72 hours.  HgbA1C: No results for input(s): \"LABA1C\" in the last 72 hours.        UA: No results for input(s): \"SPECGRAV\", \"PHUR\", \"COLORU\", \"CLARITYU\", \"MUCUS\", \"PROTEINU\", \"BLOODU\", \"RBCUA\", \"WBCUA\", \"BACTERIA\", \"NITRU\", \"GLUCOSEU\", \"BILIRUBINUR\", \"UROBILINOGEN\", \"KETUA\", \"LABCAST\", \"LABCASTTY\", \"AMORPHOS\" in the last 72 hours.    Invalid input(s): \"CRYSTALS\"      Imaging:  Echo:  EKG:  Micro: No results found for: \"BC\"    ASSESSMENT AND PLAN  ESRD: Pt was seen and examined  on IHD  , she is tolerating the procedure  , mainly for solute removal   Hyperkalemia:  K+ 5.5.  IHD today  with 2 K bath   Atrial Fibrillation: cardiology has been consulted  ,  would consider Albumin and midodrine  to keep systolic BP >90   Keep Mag>2   Anemia of CKD:  check iron panel with am labs  , Iv iron and Epogen per protocol   Metabolic bone disease : check PTH , phos with am labs       Thank you  for allowing us to participate in care of Alisha Armas      Electronically signed by Bang Tse MD on 12/7/2024 at 12:17 PM    Board Certified Nephrologist   
Frequency and one order with Frequency of every 4 hours PRN wheezing or increased work of breathing using Per Protocol order mode.       11-13 - enter or revise RT Bronchodilator order(s) to one equivalent RT bronchodilator order with QID Frequency and an Albuterol order with Frequency of every 4 hours PRN wheezing or increased work of breathing using Per Protocol order mode.      Greater than 13 - enter or revise RT Bronchodilator order(s) to one equivalent RT bronchodilator order with every 4 hours Frequency and an Albuterol order with Frequency of every 2 hours PRN wheezing or increased work of breathing using Per Protocol order mode.     For patients with COPD, RT to enter RT Home Evaluation for COPD & MDI Assessment order using Per Protocol order mode.       Standing Status:   Standing     Number of Occurrences:   8    EKG 12 Lead     Standing Status:   Standing     Number of Occurrences:   1     Order Specific Question:   Reason for Exam?     Answer:   Irregular heart rate    EKG Rhythm Strip     Standing Status:   Standing     Number of Occurrences:   1    Hemodialysis inpatient     Standing Status:   Standing     Number of Occurrences:   1     Order Specific Question:   Blood flow rate (BFR)     Answer:   300     Order Specific Question:   K+     Answer:   2     Order Specific Question:   Ca++     Answer:   2.5     Order Specific Question:   Bicarb     Answer:   35     Order Specific Question:   Na+     Answer:   135     Order Specific Question:   Dialyzer     Answer:   F160     Order Specific Question:   Dialysate Temperature (C)     Answer:   36     Order Specific Question:   Dialysate flow rate (DFR)     Answer:   600     Order Specific Question:   Treatment Time     Answer:   3 hours     Order Specific Question:   Fluid removal (L)     Answer:   1 L as tolerated    ADMIT TO INPATIENT     Standing Status:   Standing     Number of Occurrences:   1     Order Specific Question:   Telemetry/Cardiac Monitoring

## 2024-12-09 NOTE — FLOWSHEET NOTE
4 hour treatment completed. 2L of fluid removed. Cath dressing changed. Cath lines flushed with 10 ml of 0.9 NS, clamped and tego secured. Report given to primary RN. Charting printed and placed in bin to be scanned into EMR.   12/09/24 0740 12/09/24 1205   Vital Signs   BP (!) 104/56 (!) 106/59   Temp 97.3 °F (36.3 °C) 97.6 °F (36.4 °C)   Pulse 96 98   Respirations 18 19   SpO2  --  98 %   Weight - Scale 72.5 kg (159 lb 13.3 oz) 70.5 kg (155 lb 6.8 oz)   Weight Method Bed scale Bed scale   Percent Weight Change 2.84 -2.76   Post-Hemodialysis Assessment   Post-Treatment Procedures  --  Blood returned;Catheter Capped, clamped with Saline x2 ports   Machine Disinfection Process  --  Acid/Vinegar Clean;Heat Disinfect   Rinseback Volume (ml)  --  400 ml   Blood Volume Processed (Liters)  --  68.6 L   Dialyzer Clearance  --  Lightly streaked   Duration of Treatment (minutes)  --  240 minutes   Hemodialysis Intake (ml)  --  400 ml   Hemodialysis Output (ml)  --  2400 ml   NET Removed (ml)  --  2000

## 2024-12-10 VITALS
TEMPERATURE: 97.8 F | RESPIRATION RATE: 18 BRPM | HEIGHT: 66 IN | BODY MASS INDEX: 24.98 KG/M2 | WEIGHT: 155.42 LBS | DIASTOLIC BLOOD PRESSURE: 57 MMHG | SYSTOLIC BLOOD PRESSURE: 102 MMHG | HEART RATE: 87 BPM | OXYGEN SATURATION: 98 %

## 2024-12-10 LAB
ALBUMIN SERPL BCG-MCNC: 3.6 G/DL (ref 3.5–5.1)
ALP SERPL-CCNC: 332 U/L (ref 38–126)
ALT SERPL W/O P-5'-P-CCNC: 18 U/L (ref 11–66)
ANION GAP SERPL CALC-SCNC: 14 MEQ/L (ref 8–16)
AST SERPL-CCNC: 27 U/L (ref 5–40)
BASOPHILS ABSOLUTE: 0 THOU/MM3 (ref 0–0.1)
BASOPHILS NFR BLD AUTO: 0.2 %
BILIRUB SERPL-MCNC: 0.3 MG/DL (ref 0.3–1.2)
BUN SERPL-MCNC: 13 MG/DL (ref 7–22)
CALCIUM SERPL-MCNC: 9.3 MG/DL (ref 8.5–10.5)
CHLORIDE SERPL-SCNC: 95 MEQ/L (ref 98–111)
CO2 SERPL-SCNC: 25 MEQ/L (ref 23–33)
CREAT SERPL-MCNC: 3.1 MG/DL (ref 0.4–1.2)
DEPRECATED RDW RBC AUTO: 49 FL (ref 35–45)
EOSINOPHIL NFR BLD AUTO: 3.1 %
EOSINOPHILS ABSOLUTE: 0.2 THOU/MM3 (ref 0–0.4)
ERYTHROCYTE [DISTWIDTH] IN BLOOD BY AUTOMATED COUNT: 13.9 % (ref 11.5–14.5)
GFR SERPL CREATININE-BSD FRML MDRD: 15 ML/MIN/1.73M2
GLUCOSE SERPL-MCNC: 97 MG/DL (ref 70–108)
HCT VFR BLD AUTO: 30.1 % (ref 37–47)
HEPARIN UNFRACTIONATED: 0.44 U/ML (ref 0.3–0.7)
HEPARIN UNFRACTIONATED: 0.45 U/ML (ref 0.3–0.7)
HGB BLD-MCNC: 9.4 GM/DL (ref 12–16)
IMM GRANULOCYTES # BLD AUTO: 0.03 THOU/MM3 (ref 0–0.07)
IMM GRANULOCYTES NFR BLD AUTO: 0.4 %
LYMPHOCYTES ABSOLUTE: 0.9 THOU/MM3 (ref 1–4.8)
LYMPHOCYTES NFR BLD AUTO: 11.6 %
MAGNESIUM SERPL-MCNC: 1.9 MG/DL (ref 1.6–2.4)
MCH RBC QN AUTO: 30.3 PG (ref 26–33)
MCHC RBC AUTO-ENTMCNC: 31.2 GM/DL (ref 32.2–35.5)
MCV RBC AUTO: 97.1 FL (ref 81–99)
MONOCYTES ABSOLUTE: 1 THOU/MM3 (ref 0.4–1.3)
MONOCYTES NFR BLD AUTO: 12.9 %
NEUTROPHILS ABSOLUTE: 5.7 THOU/MM3 (ref 1.8–7.7)
NEUTROPHILS NFR BLD AUTO: 71.8 %
NRBC BLD AUTO-RTO: 0 /100 WBC
PHOSPHATE SERPL-MCNC: 3.8 MG/DL (ref 2.4–4.7)
PLATELET # BLD AUTO: 218 THOU/MM3 (ref 130–400)
PMV BLD AUTO: 9.7 FL (ref 9.4–12.4)
POTASSIUM SERPL-SCNC: 3.7 MEQ/L (ref 3.5–5.2)
PROT SERPL-MCNC: 6 G/DL (ref 6.1–8)
PTH-INTACT SERPL-MCNC: 66.2 PG/ML (ref 15–65)
RBC # BLD AUTO: 3.1 MILL/MM3 (ref 4.2–5.4)
SODIUM SERPL-SCNC: 134 MEQ/L (ref 135–145)
WBC # BLD AUTO: 8 THOU/MM3 (ref 4.8–10.8)

## 2024-12-10 PROCEDURE — 6370000000 HC RX 637 (ALT 250 FOR IP): Performed by: INTERNAL MEDICINE

## 2024-12-10 PROCEDURE — 6370000000 HC RX 637 (ALT 250 FOR IP): Performed by: PHYSICIAN ASSISTANT

## 2024-12-10 PROCEDURE — 85520 HEPARIN ASSAY: CPT

## 2024-12-10 PROCEDURE — 80053 COMPREHEN METABOLIC PANEL: CPT

## 2024-12-10 PROCEDURE — 6370000000 HC RX 637 (ALT 250 FOR IP)

## 2024-12-10 PROCEDURE — 83970 ASSAY OF PARATHORMONE: CPT

## 2024-12-10 PROCEDURE — 83735 ASSAY OF MAGNESIUM: CPT

## 2024-12-10 PROCEDURE — 84100 ASSAY OF PHOSPHORUS: CPT

## 2024-12-10 PROCEDURE — 85025 COMPLETE CBC W/AUTO DIFF WBC: CPT

## 2024-12-10 PROCEDURE — 6360000002 HC RX W HCPCS

## 2024-12-10 PROCEDURE — 2580000003 HC RX 258

## 2024-12-10 PROCEDURE — 36415 COLL VENOUS BLD VENIPUNCTURE: CPT

## 2024-12-10 PROCEDURE — 99232 SBSQ HOSP IP/OBS MODERATE 35: CPT | Performed by: FAMILY MEDICINE

## 2024-12-10 RX ADMIN — AMIODARONE HYDROCHLORIDE 200 MG: 200 TABLET ORAL at 08:59

## 2024-12-10 RX ADMIN — MIDODRINE HYDROCHLORIDE 20 MG: 10 TABLET ORAL at 20:06

## 2024-12-10 RX ADMIN — PANTOPRAZOLE SODIUM 40 MG: 40 TABLET, DELAYED RELEASE ORAL at 05:03

## 2024-12-10 RX ADMIN — CETIRIZINE HYDROCHLORIDE 10 MG: 10 TABLET, FILM COATED ORAL at 08:59

## 2024-12-10 RX ADMIN — SODIUM CHLORIDE, PRESERVATIVE FREE 10 ML: 5 INJECTION INTRAVENOUS at 08:59

## 2024-12-10 RX ADMIN — HEPARIN SODIUM 22 UNITS/KG/HR: 10000 INJECTION, SOLUTION INTRAVENOUS at 11:40

## 2024-12-10 RX ADMIN — MIDODRINE HYDROCHLORIDE 20 MG: 10 TABLET ORAL at 13:56

## 2024-12-10 RX ADMIN — LEVOTHYROXINE SODIUM 50 MCG: 0.05 TABLET ORAL at 05:03

## 2024-12-10 RX ADMIN — Medication 2000 UNITS: at 08:59

## 2024-12-10 RX ADMIN — MIDODRINE HYDROCHLORIDE 20 MG: 10 TABLET ORAL at 05:03

## 2024-12-10 RX ADMIN — OXYCODONE 5 MG: 5 TABLET ORAL at 11:03

## 2024-12-10 RX ADMIN — SODIUM CHLORIDE, PRESERVATIVE FREE 10 ML: 5 INJECTION INTRAVENOUS at 20:06

## 2024-12-10 ASSESSMENT — PAIN DESCRIPTION - FREQUENCY: FREQUENCY: CONTINUOUS

## 2024-12-10 ASSESSMENT — PAIN DESCRIPTION - ORIENTATION: ORIENTATION: RIGHT

## 2024-12-10 ASSESSMENT — PAIN - FUNCTIONAL ASSESSMENT: PAIN_FUNCTIONAL_ASSESSMENT: ACTIVITIES ARE NOT PREVENTED

## 2024-12-10 ASSESSMENT — PAIN DESCRIPTION - LOCATION: LOCATION: LEG

## 2024-12-10 ASSESSMENT — PAIN SCALES - GENERAL
PAINLEVEL_OUTOF10: 3
PAINLEVEL_OUTOF10: 0
PAINLEVEL_OUTOF10: 1

## 2024-12-10 ASSESSMENT — PAIN DESCRIPTION - PAIN TYPE: TYPE: ACUTE PAIN

## 2024-12-10 ASSESSMENT — PAIN DESCRIPTION - ONSET: ONSET: ON-GOING

## 2024-12-10 ASSESSMENT — PAIN DESCRIPTION - DESCRIPTORS: DESCRIPTORS: ACHING

## 2024-12-10 NOTE — PLAN OF CARE
Problem: Discharge Planning  Goal: Discharge to home or other facility with appropriate resources  Outcome: Progressing  Flowsheets (Taken 12/10/2024 1214)  Discharge to home or other facility with appropriate resources: Identify barriers to discharge with patient and caregiver  Note: Planning for transfer to [facility],waiting on bed      Problem: Safety - Adult  Goal: Free from fall injury  Outcome: Progressing  Note: Bed locked & in low position, call light in reach, side-rails up x2, bed/chair alarm utilized, non-slip socks on when ambulating, reminded patient to use call light to call for assistance.       Problem: Skin/Tissue Integrity  Goal: Absence of new skin breakdown  Description: 1.  Monitor for areas of redness and/or skin breakdown  2.  Assess vascular access sites hourly  3.  Every 4-6 hours minimum:  Change oxygen saturation probe site  4.  Every 4-6 hours:  If on nasal continuous positive airway pressure, respiratory therapy assess nares and determine need for appliance change or resting period.  Outcome: Progressing  Note: Dressing changes to right lower leg daily per podiatry.      Problem: Pain  Goal: Verbalizes/displays adequate comfort level or baseline comfort level  Outcome: Progressing  Flowsheets (Taken 12/10/2024 1214)  Verbalizes/displays adequate comfort level or baseline comfort level: Encourage patient to monitor pain and request assistance  Note: PRN oxy effective for pain control.      Problem: Cardiovascular - Adult  Goal: Maintains optimal cardiac output and hemodynamic stability  Outcome: Progressing  Flowsheets (Taken 12/10/2024 1214)  Maintains optimal cardiac output and hemodynamic stability: Monitor blood pressure and heart rate  Note: Pt remains in AF. HR 80-120s. Declined pacer/AV node ablation here. Would like to have it completed at [facility].      Care plan reviewed with patient.  Patient verbalizes understanding of the care plan and contributed to goal setting.

## 2024-12-10 NOTE — CARE COORDINATION
12/10/24, 2:22 PM EST    DISCHARGE ON GOING EVALUATION    Alisha SAUL [facility] day: 4  Location: 3B-30/030-A Reason for admit: Atrial fibrillation with RVR (HCC) [I48.91]     Procedures: none    Imaging since last note: none     Barriers to Discharge: Hospitalist, Nephrology, Podiatry and Cardiology following. ID signed off. PT/OT. Cardiology recommending pacemaker. Heparin gtt. PO amiodarone. Midodrine TID. Roxicodone prn.     Vitals:    12/10/24 1149 12/10/24 1356 12/10/24 1414 12/10/24 1430   BP: (!) 91/51 (!) 78/39 (!) 81/54 (!) 85/65   Pulse: (!) 104 (!) 110 100 (!) 101   Resp: 16      Temp: 98 °F (36.7 °C)      TempSrc:       SpO2: 94%      Weight:       Height:         PCP: Rupal Thomas MD  Readmission Risk Score: 16.2    Patient Goals/Plan/Treatment Preferences: Fro home w/ daughter. Current at [facility] for HD, MWF. Accepted to [facility]- awaiting bed.

## 2024-12-10 NOTE — PLAN OF CARE
Problem: Discharge Planning  Goal: Discharge to home or other facility with appropriate resources  Outcome: Progressing  Flowsheets (Taken 12/9/2024 1930)  Discharge to home or other facility with appropriate resources: Identify barriers to discharge with patient and caregiver     Problem: Safety - Adult  Goal: Free from fall injury  Outcome: Progressing     Problem: Skin/Tissue Integrity  Goal: Absence of new skin breakdown  Description: 1.  Monitor for areas of redness and/or skin breakdown  2.  Assess vascular access sites hourly  3.  Every 4-6 hours minimum:  Change oxygen saturation probe site  4.  Every 4-6 hours:  If on nasal continuous positive airway pressure, respiratory therapy assess nares and determine need for appliance change or resting period.  Outcome: Progressing     Problem: Pain  Goal: Verbalizes/displays adequate comfort level or baseline comfort level  Outcome: Progressing     Problem: Cardiovascular - Adult  Goal: Maintains optimal cardiac output and hemodynamic stability  Outcome: Progressing  Flowsheets (Taken 12/9/2024 1930)  Maintains optimal cardiac output and hemodynamic stability: Monitor blood pressure and heart rate     Problem: Cardiovascular - Adult  Goal: Absence of cardiac dysrhythmias or at baseline  Outcome: Progressing  Flowsheets (Taken 12/9/2024 1930)  Absence of cardiac dysrhythmias or at baseline: Monitor cardiac rate and rhythm

## 2024-12-11 LAB
BACTERIA BLD AEROBE CULT: NORMAL
BACTERIA BLD AEROBE CULT: NORMAL

## 2024-12-11 NOTE — DISCHARGE SUMMARY
hours:[id number]} in the examination, evaluation, counseling and review of medications and discharge plan.    Discharge Medications for PCI/MI (performed or attempted):   ASA:   ***    Statin:   ***  ACE/ARB:  ***   P2Y12 Inhibitor:  ***   Beta Blocker:   ***  Nitro SL:   ***   Cardiac Rehab:  ***  Dietary Consult:  ***           Signed:    Thank you Rupal Thomas MD for the opportunity to be involved in this patient's care.    Electronically signed by Shawn Randle MD on 12/11/2024 at 3:08 PM    This note was electronically signed. Parts of this note may have been dictated by use of voice recognition software and electronically transcribed. The note may contain errors not detected in proofreading. Please refer to my supervising physician's documentation if my documentation differs.

## 2024-12-11 NOTE — PROGRESS NOTES
Progress note: Infectious diseases    Patient - Alisha Armas,  Age - 79 y.o.    - 1945      Room Number - 3B-30/030-A   MRN -  [id number]   [facility] # - [id number]  Date of Admission -  2024  4:44 PM    SUBJECTIVE:   No new issues  OBJECTIVE   VITALS    height is 1.676 m (5' 6\") and weight is 70.5 kg (155 lb 6.8 oz). Her oral temperature is 98.2 °F (36.8 °C). Her blood pressure is 111/61 and her pulse is 93. Her respiration is 18 and oxygen saturation is 96%.       Wt Readings from Last 3 Encounters:   24 70.5 kg (155 lb 6.8 oz)   24 75.8 kg (167 lb 1.7 oz)       I/O (24 Hours)    Intake/Output Summary (Last 24 hours) at 2024 1056  Last data filed at 2024 0904  Gross per 24 hour   Intake 1308.91 ml   Output 1400 ml   Net -91.09 ml       General Appearance  Awake, alert, oriented,  not  In acute distress  HEENT - normocephalic, atraumatic, pink conjunctiva,  anicteric sclera  Neck - Supple, no mass  Lungs -  Bilateral   air entry, no rhonchi, no wheeze  Cardiovascular - Heart sounds are normal.     Abdomen - soft, not distended, nontender,   Neurologic -oriented  Skin -+ bruising or bleeding  Extremities - open wound on the right lower leg, the wound is clean  Non stick dressing noted  No redness or purulent drainage    MEDICATIONS:      midodrine  20 mg Oral 3 times per day    sodium chloride flush  5-40 mL IntraVENous 2 times per day    vancomycin (VANCOCIN) intermittent dosing (placeholder)   Other RX Placeholder    Vitamin D  2,000 Units Oral Daily    ivabradine  2.5 mg Oral BID WC    levothyroxine  50 mcg Oral Daily    macitentan  10 mg Oral Daily    pantoprazole  40 mg Oral QAM AC    selexipag  800 tablet Oral BID    cetirizine  10 mg Oral Daily      heparin (PORCINE) Infusion 16 Units/kg/hr (24 0938)    amiodarone 0.5 mg/min (24 0402)    sodium chloride       ipratropium 
                                                                                          Progress note: Infectious diseases    Patient - Alisha Armas,  Age - 79 y.o.    - 1945      Room Number - 3B-30/030-A   MRN -  [id number]   [facility] # - [id number]  Date of Admission -  2024  4:44 PM    SUBJECTIVE:   Awaiting transfer to [facility]  Denies any fever or chills  OBJECTIVE   VITALS    height is 1.676 m (5' 6\") and weight is 70.5 kg (155 lb 6.8 oz). Her oral temperature is 98.2 °F (36.8 °C). Her blood pressure is 106/65 and her pulse is 108 (abnormal). Her respiration is 16 and oxygen saturation is 100%.       Wt Readings from Last 3 Encounters:   24 70.5 kg (155 lb 6.8 oz)   24 75.8 kg (167 lb 1.7 oz)       I/O (24 Hours)    Intake/Output Summary (Last 24 hours) at 12/10/2024 0954  Last data filed at 12/10/2024 0338  Gross per 24 hour   Intake 1412.42 ml   Output 2400 ml   Net -987.58 ml       General Appearance  Awake, alert, oriented,  not  In acute distress  HEENT - normocephalic, atraumatic, pink conjunctiva,  anicteric sclera  Neck - Supple, no mass  Lungs -  Bilateral   air entry, no rhonchi, no wheeze  Cardiovascular - Heart sounds are normal.     Abdomen - soft, not distended, nontender,   Neurologic -oriented  Skin -+ bruising or bleeding  Extremities - dressed right lower leg wound       MEDICATIONS:      amiodarone  200 mg Oral Daily    midodrine  20 mg Oral 3 times per day    sodium chloride flush  5-40 mL IntraVENous 2 times per day    Vitamin D  2,000 Units Oral Daily    levothyroxine  50 mcg Oral Daily    macitentan  10 mg Oral Daily    pantoprazole  40 mg Oral QAM AC    selexipag  800 tablet Oral BID    cetirizine  10 mg Oral Daily      heparin (PORCINE) Infusion 22 Units/kg/hr (12/10/24 1933)    sodium chloride       ipratropium 0.5 mg-albuterol 2.5 mg, heparin (porcine), heparin (porcine), sodium chloride flush, sodium chloride, ondansetron **OR** ondansetron, polyethylene 
    Pharmacist Review and Automatic Dose Adjustment of Prophylactic Enoxaparin or Heparin    Reviewed reason for admission/hospital problem list    The reviewing pharmacist has made an adjustment to the ordered enoxaparin or heparin dose or converted to heparin per the approved [facility] protocol and table as identified below.      Recent Labs     12/06/24  1808   CREATININE 5.7*     Estimated Creatinine Clearance: 7 mL/min (A) (based on SCr of 5.7 mg/dL (HH)).    Recent Labs     12/06/24  1807   HGB 10.1*   HCT 31.5*        No results for input(s): \"INR\" in the last 72 hours.    Height:   Ht Readings from Last 1 Encounters:   12/06/24 1.676 m (5' 6\")     Weight:  Wt Readings from Last 1 Encounters:   12/06/24 71 kg (156 lb 8.4 oz)       *Do not exceed enoxaparin 40mg daily or UFH 5000 units SUBQ TID in patients with epidurals,  lumbar drains, or external ventricular drains.    Plan:   Changed enoxaparin 40 mg subq daily to heparin 5,000 units subq TID.     Thank you,  Ilana Huerta, PharmD  PGY1 Pharmacy Resident  12/6/2024 6:55 PM       
  PROGRESS NOTE      Patient:  Alisha Armas  Unit/Bed:3B-30/030-A  YOB: 1945  MRN: [id number]   Acct: [id number]    PCP: Rupal Thomas MD    Date of Admission: 12/6/2024 LOS: 1    Date of Evaluation:  12/7/2024    Anticipated Discharge: pending hospital course    Assessment/Plan:    Chronic Atrial fibrillation with RVR with secondary hypercoagulable state with hypotension POA, CHADVASC 4. No hx of ablation/watchman.  Hx of failed rhythm control and prior Hx of warfarin use. Patient is a poor historian.  Per patient's daughter, warfarin was dc'd one year ago d/t Hx of GI bleeds, with lowest hemoglobin of 5.  Patient is a [employer] and does not receive blood.  Therefore, decision was made by family to discontinue warfarin. No reported Hx of Xarelto/Eliquis use.  Patient able to tolerate heparin per daughter.  On home metoprolol 25 mg twice daily.   Patient was transferred from OSH, noted with A-fib with RVR, HR 140s with hypotension, received IV Cardizem 5 mg once, which controlled rate.  Home metoprolol was held on admission d/t hypotension.  EKG  (12/7) noted persistent atrial fibrillation with rapid ventricular response with HR 140s, received another dose of 2.5 mg Cardizem with no improvement  12/6- TSH 2.68 T4 WNL 1.16  Started on amiodarone gtt. and heparin gtt. per cards recs  Cardio c/s, appreciate recs.     Traumatic right lower leg wound POA. likely 2/2 traumatic wound  Per chart review, patient had a traumatic fall 1 week prior, and sustained a right lower shin wound requiring staples.  She was managed with Keflex at that time. At OSH, Imaging was concerning for RLE cellulitis, and patient received Zosyn and vancomycin with staples removal.   Xray of right tibia/fibula from OSH [12/6]- Anterior and posterior mid distal soft tissue swelling and edema. No associated osseous erosive or destructive pattern.  Elevated ESR 42, CRP 20 , blood cx. NGTD,    Received Vancomycin 
Alisha is an 79 y.o. female from [facility] ED with Dr Calero requesting transfer. PMHx ESRD on M-W-F dialysis, Hypotension, afib. She is on home midodrine. Alisha went to dialysis today and she c/o lightheadedness with associated hypotension and tachycardia, so she was sent to [facility] ED. She did not receive dialysis today. On arrival to their ED, she was in atrial fib rate 140's with hypotension. It was also reported that 8 or 9 days ago she fell and injured her RLE, in which she received laly. Today Dr Calero removed 16 staples from the area. She has cellulitis of the RLE at the site of injury. WBC 8,000, lactate normal, hgb 11.7, troponin 97 (reportedly similar to past troponin levels). EKG was atrial fib RVR without concern for ichemia. Soft tissue RLE XR consistent with cellulitis. Received Zosyn and Vancomycin, along with Cardizem 5 mg one time dose with HR currently ranging , along with midodrine with last BP 96/50, RR 21, SpO2 94% RA, afebrile. Accepted in transfer to a stepdown level of care, under Dr Can.  
Cardiology Progress Note      Patient:  Alisha Armas  YOB: 1945  MRN: [id number]   Acct: [id number]  Admit Date:  12/6/2024  Primary Cardiologist:  ?[facility]    Note per dr duarte \"REASON FOR CONSULTATION:  Atrial fibrillation with rapid ventricular response.     HISTORY OF PRESENT ILLNESS:  This is a pleasant 79-year-old lady, who apparently follows with a local cardiologist, Dr. Dominique.  She does have history of renal failure, on dialysis for the last couple of years; history of atrial fibrillation that has failed rhythm control and has been with the rate controlled for a while.  Comes in with some lower extremity cellulitis, and during the workup, she was noted to have hypotension and tachycardia.  Heart rate has been running in the 140-150 range.  The patient is a dialysis patient and follows with Nephrology.  She denies any active chest pain.  She denies any knowledge of any significant coronary artery disease, at least from what I could tell.  Denies any recent intervention or catheterization\"    Subjective (Events in last 24 hours): pt awake and alert.  NAD. No cp or sob. No edema or orthopnea.  Had small amount of blood from rectum after bowel movement.  No blood in stool reported by OT.     On RA  On amio gtt  On heparin gtt        Objective:   BP (!) 97/56   Pulse 80   Temp 97.8 °F (36.6 °C) (Oral)   Resp 19   Ht 1.676 m (5' 6\")   Wt 70.5 kg (155 lb 6.8 oz)   LMP  (LMP Unknown)   SpO2 97%   BMI 25.09 kg/m²        TELEMETRY: afib cvr 90s    Physical Exam:  General Appearance: alert and oriented to person, place and time, in no acute distress  Cardiovascular: irregularly irregular  Pulmonary/Chest: clear to auscultation bilaterally- no wheezes, rales or rhonchi, normal air movement, no respiratory distress  Abdomen: soft, non-tender, non-distended, normal bowel sounds, no masses Extremities: no cyanosis, clubbing or edema, pulse   Skin: warm and dry, no rash or erythema  Head: 
[facility]   Pharmacy Pharmacokinetic Monitoring Service - Vancomycin    Alisha Armas is a 79 y.o. female starting on vancomycin therapy for SSTI. Pharmacy consulted by Dr. Brito for monitoring and adjustment.    Target Concentration: Pre-dialysis concentration 15-20    Additional Antimicrobials: ceftriaxone     Pertinent Laboratory Values:   Wt Readings from Last 1 Encounters:   12/06/24 71 kg (156 lb 8.4 oz)     Temp Readings from Last 1 Encounters:   12/06/24 97.3 °F (36.3 °C) (Oral)     Recent Labs     12/06/24  1807 12/06/24  1808   CREATININE  --  5.7*   BUN  --  35*   WBC 7.6  --      Pertinent Cultures:  Date Source Results   12/06/24 BC X2 Sent   MRSA Nasal Swab: N/A. Non-respiratory infection.    Plan:  Concentration-guided dosing due to renal impairment and intermittent hemodialysis   Patient received Vancomycin 1500 mg loading dose at outside facility today @ 1220  Vancomycin concentration ordered for 12/07/2024 with AM labs.  Pharmacy will monitor renal function daily and adjust therapy as indicated.    Thank you for the consult,  Ilana Huerta, PharmD  PGY1 Pharmacy Resident  12/6/2024 7:09 PM   
Patient came up to the floor with heparin gtt turned off. Dialysis unaware of it being shut off. Looks like in the infusion rate verify it was shut off around 1055 in dialysis and never restarted. Spoke with pharmacy and orders to resume the current rate and recheck the xa 6 hours from now.   
Patient transported to [facility] via [facility] at this time in stable condition. Telemetry removed. Patient sent with all belongings. Report called to Wilberto at [facility].   
Pt was with her daughter. Both declined prayer and were not interest with   12/10/24 1437   Encounter Summary   Encounter Overview/Reason Spiritual/Emotional Needs   Service Provided For Patient and family together   Referral/Consult From [facility]   Support System Children   Last Encounter  12/10/24   Complexity of Encounter Low   Begin Time 0841   End Time  0846   Total Time Calculated 5 min   Spiritual/Emotional needs   Type Spiritual Support   Assessment/Intervention/Outcome   Outcome Refused/Declined      the visit.    
Renal Progress Note    Pt Name: Alisha Armas  MRN: [id number]  [id number]  YOB: 1945  Admit Date: 12/6/2024  4:44 PM  Date of evaluation: 12/09/24  Primary Care Physician: Rupal Thomas MD   3B-30/030-A       Subjective:     Interval History:   No complaints     Diet: Diet NPO    Medications:   Scheduled Meds:   amiodarone  200 mg Oral Daily    midodrine  20 mg Oral 3 times per day    sodium chloride flush  5-40 mL IntraVENous 2 times per day    Vitamin D  2,000 Units Oral Daily    levothyroxine  50 mcg Oral Daily    macitentan  10 mg Oral Daily    pantoprazole  40 mg Oral QAM AC    selexipag  800 tablet Oral BID    cetirizine  10 mg Oral Daily     Continuous Infusions:   heparin (PORCINE) Infusion 22 Units/kg/hr (12/10/24 0459)    sodium chloride         Objective:   Vitals:   BP (!) 97/53   Pulse 100   Temp 97.5 °F (36.4 °C) (Oral)   Resp 16   Ht 1.676 m (5' 6\")   Wt 70.5 kg (155 lb 6.8 oz)   LMP  (LMP Unknown)   SpO2 94%   BMI 25.09 kg/m²     I&O's:    Intake/Output Summary (Last 24 hours) at 12/10/2024 0537  Last data filed at 12/10/2024 0338  Gross per 24 hour   Intake 1412.42 ml   Output 2400 ml   Net -987.58 ml     I/O last 3 completed shifts:  In: 2002.4 [P.O.:1130; I.V.:472.4]  Out: 2400    Date 12/10/24 0000 - 12/10/24 2359   Shift 9161-1864 5748-2768 6017-5923 24 Hour Total   INTAKE   P.O.(mL/kg/hr) 120   120   I.V.(mL/kg) 180(2.6)   180(2.6)   Shift Total(mL/kg) 300(4.3)   300(4.3)   OUTPUT   Urine(mL/kg/hr) 0   0   Emesis/NG output(mL/kg) 0(0)   0(0)   Other(mL/kg) 0(0)   0(0)   Stool(mL/kg) 0(0)   0(0)   Blood(mL/kg) 0(0)   0(0)   Shift Total(mL/kg) 0(0)   0(0)   Weight (kg) 70.5 70.5 70.5 70.5       General appearance: no distress  HEENT: PERRLA, EOMI, NON ICTERIC  Neck: NO LAD, NO THYROMEGALY  Lungs: CLEAR TO AUSCULTATION BILATERALLY  Heart: S1 S2 NORMAL, REGULAR RATE AND RHYTHM, NO AUDIBLE MURMURS  Abdomen: SOFT, NON TENDER, NON DISTENDED, NO ORGANOMEGALY FELT, 
This  visited Alisha, a 79 year old female admitted on 3B. During this visit, patient is not available in her room. Pt did not have family members in the room as well. This  proceeded to make round on the unit hoping for patient to be brought back.  was not able to see patient during this visit.  staff will return to see patient during next rounding to provide spiritual support as needed.   
[facility]  PHYSICAL THERAPY MISSED TREATMENT NOTE  [facility]Z CCU-STEPDOWN 3B    Date: 2024  Patient Name: Alisha Armas        MRN: [id number]   : 1945  (79 y.o.)  Gender: female                REASON FOR MISSED TREATMENT:  Patient at testing and/or off unit.      Pt off the floor at time of attempt, will try back as able        
Vancomycin Day: 2  Current Regimen: Intermittent      Date Time HD Vancomycin Dose Random Level   12/6/24 1230  1500 mg    12/7/24 0753   17.6                                           Plan:   Gets dialysis MWF  Vancomycin  1000 mgx1   Will measure random conc on Monday 12/9    
acetaminophen **OR** acetaminophen, albuterol sulfate HFA, fluticasone, melatonin, oxyCODONE      LABS:     CBC:   Recent Labs     12/07/24  1007 12/08/24  0840 12/09/24  0657   WBC 6.4 7.2 7.8   HGB 9.7* 9.4* 9.4*    250 258     BMP:    Recent Labs     12/07/24  0753 12/08/24  0840 12/09/24  0657   * 133* 127*   K 5.6* 4.4 4.7   CL 91* 91* 89*   CO2 22* 25 21*   BUN 40* 17 25*   CREATININE 6.3* 4.1* 5.1*   GLUCOSE 86 87 91     Calcium:  Recent Labs     12/09/24  0657   CALCIUM 9.5     Ionized Calcium:Invalid input(s): \"IONCA\"  Magnesium:  Recent Labs     12/06/24  1808   MG 2.0     Phosphorus:No results for input(s): \"PHOS\" in the last 72 hours.  BNP:No results for input(s): \"BNP\" in the last 72 hours.  Glucose:No results for input(s): \"POCGLU\" in the last 72 hours.  HgbA1C: No results for input(s): \"LABA1C\" in the last 72 hours.  INR:   Recent Labs     12/07/24  1007   INR 0.98     Hepatic:   Recent Labs     12/06/24  1808 12/07/24  0753 12/08/24  0840 12/09/24  0657   ALKPHOS 286* 293* 296* 305*   ALT 15 19 19 18   AST 26 39 37 32   BILITOT 0.4 0.5 0.3 0.3   BILIDIR 0.2  --   --   --      Amylase and Lipase:  Recent Labs     12/06/24  1807   LACTA 1.3     Lactic Acid:   Recent Labs     12/06/24  1807   LACTA 1.3     Troponin: No results for input(s): \"CKTOTAL\", \"CKMB\", \"TROPONINI\" in the last 72 hours.  BNP: No results for input(s): \"BNP\" in the last 72 hours.    CULTURES:   UA: No results for input(s): \"SPECGRAV\", \"PHUR\", \"COLORU\", \"CLARITYU\", \"MUCUS\", \"PROTEINU\", \"BLOODU\", \"RBCUA\", \"WBCUA\", \"BACTERIA\", \"NITRU\", \"GLUCOSEU\", \"BILIRUBINUR\", \"UROBILINOGEN\", \"KETUA\", \"LABCAST\", \"LABCASTTY\", \"AMORPHOS\" in the last 72 hours.    Invalid input(s): \"CRYSTALS\"  Micro:   Lab Results   Component Value Date/Time    BC No growth 24 hours. No growth 48 hours. 12/06/2024 06:07 PM    BC No growth 24 hours. No growth 48 hours. 12/06/2024 06:07 PM          Problem list of patient:     Patient Active Problem List 
and afeb, WBC normal -- ID c/s also notes no signs of infection and rec stopping atbx --> rocephin started on admission and vanc initially added 12/7 both stopped 12/7 and to monitor -- wound care per podiatry and ID recs.  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 Tamayo -- stable on RA -- cont home Uptravi, Opsumit  -- Echo 11/21/24 at [facility] = EF 45-50%, no WMA, Rv systolic fxn mildly reduced, mild TR, trace MR, RVSP 47 mmHg  Chronic HFmrEF -- Echo 11/21/24 at [facility] = EF 45-50%, no WMA, Rv systolic fxn mildly reduced, mild TR, trace MR, RVSP 47 mmHg -- Pt ESRD on HD thus fluid mgmt per renal  -- no BB due to hypotension issues  COPD w/o exacerbation -- resp status stable on RA -- prn albuterol -- monitor  Cirrhosis -- per [facility] imaging MRI abd 11/2023 with ?hemochromatosis - iron held -- f/u outpt - asx   Chronic normocytic anemia -- likely due to ESRD -- stable in 9's - monitor - ?FRAN - per renal -- iron held with ?cirrhosis with hemochromatosis -- no signs of currently bleeding but started on heparin gtt 12/7 and hx of GIB per daughter -- cont monitor h/h closely  Chronically elevated alk phos -- in 200's since admission and similar to prior 5/2024 here -- ?related to cirrhosis hx -   Hypothyroidism -- TFT 12/6 Wnl - cont home synthroid  Mild/mod MR, mild TR -- noted per prior echo's with last 11/21/24 at [facility]  GERD -- cont PPI  Hx of GIB with 
bedroom/bathroom  Home Access: Ramped entrance  Home Equipment: Cane, Rollator   Bathroom Shower/Tub: Walk-in shower (pt sponge bathes at baseline 2/2 port)  Bathroom Toilet: Handicap height  Bathroom Equipment: Grab bars in shower, Built-in shower seat, Grab bars around toilet    Receives Help From: Family  Prior Level of Assist for ADLs: Independent  Prior Level of Assist for Homemaking: Needs assistance  Prior Level of Assist for Ambulation: Independent household ambulator, with or without device, Independent community ambulator, with or without device  Has the patient had two or more falls in the past year or any fall with injury in the past year?: No (1 fall PTA resulting in skin tear on RLE)    Active : No  Patient's  Info: COA  Leisure & Hobbies: jewel  Additional Comments: Daughter Mariaa is able to provide 24 hr sup/assist. Daughter manages all IADLs. Prior to fall, pt using cane in the home and rollator in the community.    VISION:Corrected (was supposed to  new glasses/bifocals but was admitted), hx of cataract sx bilaterally    HEARING:  WFL    COGNITION: Slow Processing and Decreased Safety Awareness    RANGE OF MOTION:  Bilateral Upper Extremity:  WFL    STRENGTH:  Bilateral Upper Extremity:  WFL    Hand Dominance: Right    SENSATION:   WFL    ADL:   Grooming: Supervision and with set-up.  Completed oral care, denture care, washed face, and combed hair all with set up while seated in chair.  Footwear Management: Moderate Assistance.  To don socks, unable to complete via figure 4 or forward flexion  Toileting: Moderate Assistance, with set-up, with verbal cues , and with increased time for completion.  Assist provided for hygiene and clothing management.  Toilet Transfer: Contact Guard Assistance, with set-up, with verbal cues , and with increased time for completion. W/ grab bars for steadying on BSC .    IADL:   Not Tested    BALANCE:  Sitting Balance:  Supervision.    Standing 
right lower leg.  Upon initial exam, there was noted to be necrotic skin overlying the wound.  Wound bed has a red beefy granular base with good bleeding.  There are small islands of necrosis scattered throughout.  There is no purulence.  Does not probe deep.  Minimal erythema surrounding however there is no cellulitis or lymphangitic streaking. No crepitus of surrounding skin.     There is also a small laceration noted to the right lateral knee.  It has scabbed over with healthy appearing skin surrounding.  No cellulitis, drainage, streaking.     Neurovascular: Light touch and protopathic sensation is intact to the bilateral lower extremity.     Musculoskeletal: Formal range of motion and muscle strength testing deferred due to presence of wound.  Foot and ankle are in relatively rectus alignment bilaterally.    Labs     BMP:   Lab Results   Component Value Date/Time     12/09/2024 06:57 AM    K 4.7 12/09/2024 06:57 AM    CL 89 12/09/2024 06:57 AM    CO2 21 12/09/2024 06:57 AM    BUN 25 12/09/2024 06:57 AM    CREATININE 5.1 12/09/2024 06:57 AM     CBC:   Lab Results   Component Value Date/Time    WBC 7.8 12/09/2024 06:57 AM    HGB 9.4 12/09/2024 06:57 AM    HCT 31.2 12/09/2024 06:57 AM     PT/INR:   Lab Results   Component Value Date/Time    INR 0.98 12/07/2024 10:07 AM     Albumin:No results found for: \"LABALBU\"  Sed Rate:  Lab Results   Component Value Date/Time    SEDRATE 42 12/06/2024 06:08 PM     CRP:   Lab Results   Component Value Date/Time    CRP 2.00 12/06/2024 06:08 PM     Micro:   Lab Results   Component Value Date/Time    BC No growth 24 hours. No growth 48 hours. 12/06/2024 06:07 PM    BC No growth 24 hours. No growth 48 hours. 12/06/2024 06:07 PM      Hemoglobin A1C: No results found for: \"LABA1C\"    Images  No orders to display       Assessment     Chronic  Patient Active Problem List   Diagnosis    Hypotension    ESRD on hemodialysis (HCC)    Atrial fibrillation with RVR (HCC)    PAH 
  INR 0.98     No results for input(s): \"CKTOTAL\", \"TROPONINT\" in the last 72 hours.    Urinalysis:    No results found for: \"NITRU\", \"WBCUA\", \"BACTERIA\", \"RBCUA\", \"BLOODU\", \"SPECGRAV\", \"GLUCOSEU\"    All radiology images and reports reviewed and interpreted by me:  Radiology:  No orders to display       Diet: Diet NPO    Microbiology: Blood cultures neg to date   Antibiotics: no - discontinued on 12/7     Steroids: no    Telemetry: [x]Yes / []No  Telemetry Review of past 24 hours:  a.fib w/ intermittent tachycardia    LDA: []CVC / []PICC / []Midline / []Z[place]aga / []Drains / []Mediport / []PIV /   [x]HD catheter - right upper chest     Labs (still needed?): [x]Yes / []No  IVF (still needed?): []Yes / [x]No    Level of care: [x]Step Down / []Med-Surg  Bed Status: [x]Inpatient / []Observation    DVT Prophylaxis: [] Lovenox / [x] Heparin / [] SCDs / [] Already on Systemic Anticoagulation / [] None     PT/OT: [x]Yes / []No    Disposition:    [] Home       [] TCU       [] Rehab       [] Psych       [] SNF       [] Long Term Care Facility       [x] Other- Transfer     Code Status: Limited      An electronic signature was used to authenticate this note  - Shawn Randle MD PGY-3 on 12/10/2024 at 7:18 AM    This note was electronically signed. Parts of this note may have been dictated by use of voice recognition software and electronically transcribed. The note may contain errors not detected in proofreading. Please refer to my supervising physician's documentation if my documentation differs.          
electronically signed. Parts of this note may have been dictated by use of voice recognition software and electronically transcribed. The note may contain errors not detected in proofreading. Please refer to my supervising physician's documentation if my documentation differs.

## 2024-12-11 NOTE — CARE COORDINATION
12/11/24, 1:35 PM EST    Patient goals/plan/ treatment preferences discussed by  and .  Patient goals/plan/ treatment preferences reviewed with patient/ family.  Patient/ family verbalize understanding of discharge plan and are in agreement with goal/plan/treatment preferences.  Understanding was demonstrated using the teach back method.  AVS provided by RN at time of discharge, which includes all necessary medical information pertaining to the patients current course of illness, treatment, post-discharge goals of care, and treatment preferences.     Services At/After Discharge: Acute Hospital    Patient discharged to [facility] via [facility] 12/10.

## 2024-12-11 NOTE — PLAN OF CARE
Problem: Discharge Planning  Goal: Discharge to home or other facility with appropriate resources  12/10/2024 2332 by Adela Hutchinson RN  Outcome: Completed  12/10/2024 1214 by Denia Erazo RN  Outcome: Progressing  Flowsheets (Taken 12/10/2024 1214)  Discharge to home or other facility with appropriate resources: Identify barriers to discharge with patient and caregiver  Note: Planning for transfer to [facility],waiting on bed      Problem: Safety - Adult  Goal: Free from fall injury  12/10/2024 2332 by Adela Hutchinson RN  Outcome: Completed  12/10/2024 1214 by Denia Erazo RN  Outcome: Progressing  Note: Bed locked & in low position, call light in reach, side-rails up x2, bed/chair alarm utilized, non-slip socks on when ambulating, reminded patient to use call light to call for assistance.       Problem: Skin/Tissue Integrity  Goal: Absence of new skin breakdown  Description: 1.  Monitor for areas of redness and/or skin breakdown  2.  Assess vascular access sites hourly  3.  Every 4-6 hours minimum:  Change oxygen saturation probe site  4.  Every 4-6 hours:  If on nasal continuous positive airway pressure, respiratory therapy assess nares and determine need for appliance change or resting period.  12/10/2024 2332 by Adela Hutchinson RN  Outcome: Completed  12/10/2024 1214 by Denia Erazo RN  Outcome: Progressing  Note: Dressing changes to right lower leg daily per podiatry.      Problem: Pain  Goal: Verbalizes/displays adequate comfort level or baseline comfort level  12/10/2024 2332 by Adela Hutchinson RN  Outcome: Completed  12/10/2024 1214 by Denia Erazo RN  Outcome: Progressing  Flowsheets (Taken 12/10/2024 1214)  Verbalizes/displays adequate comfort level or baseline comfort level: Encourage patient to monitor pain and request assistance  Note: PRN oxy effective for pain control.      Problem: Chronic Conditions and Co-morbidities  Goal: Patient's chronic

## 2024-12-26 ENCOUNTER — TELEPHONE (OUTPATIENT)
Dept: WOUND CARE | Age: 79
End: 2024-12-26

## 2024-12-26 NOTE — TELEPHONE ENCOUNTER
fabricio called to validate that this patient [facility] and she will sign for wound supply orders. Stated that A [facility] CNP has not seen patient yet and will not sign orders until after visit. Gave fabricio the referring doctor's name.

## 2025-01-07 PROBLEM — R79.89 ELEVATED TROPONIN: Status: RESOLVED | Noted: 2024-12-08 | Resolved: 2025-01-07

## 2025-01-30 ENCOUNTER — HOSPITAL ENCOUNTER (OUTPATIENT)
Dept: WOUND CARE | Age: 80
Discharge: HOME | End: 2025-01-30
Attending: NURSE PRACTITIONER
Payer: MEDICARE

## 2025-01-30 VITALS
BODY MASS INDEX: 24.83 KG/M2 | DIASTOLIC BLOOD PRESSURE: 52 MMHG | HEART RATE: 78 BPM | TEMPERATURE: 97.7 F | OXYGEN SATURATION: 90 % | SYSTOLIC BLOOD PRESSURE: 97 MMHG | RESPIRATION RATE: 16 BRPM | HEIGHT: 65 IN | WEIGHT: 149 LBS

## 2025-01-30 DIAGNOSIS — S81.801D TRAUMATIC OPEN WOUND OF RIGHT LOWER LEG, SUBSEQUENT ENCOUNTER: Primary | ICD-10-CM

## 2025-01-30 DIAGNOSIS — I83.893 VARICOSE VEINS OF BOTH LEGS WITH EDEMA: ICD-10-CM

## 2025-01-30 PROBLEM — I10 ESSENTIAL (PRIMARY) HYPERTENSION: Status: ACTIVE | Noted: 2018-03-25

## 2025-01-30 PROBLEM — I10 ESSENTIAL (PRIMARY) HYPERTENSION: Status: RESOLVED | Noted: 2018-03-25 | Resolved: 2025-01-30

## 2025-01-30 PROBLEM — Z99.2 ESRD ON DIALYSIS (HCC): Status: ACTIVE | Noted: 2024-04-24

## 2025-01-30 PROBLEM — I27.20 PULMONARY HYPERTENSION (HCC): Status: ACTIVE | Noted: 2018-10-10

## 2025-01-30 PROBLEM — N18.6 ESRD ON DIALYSIS (HCC): Status: ACTIVE | Noted: 2024-04-24

## 2025-01-30 PROBLEM — I48.91 ATRIAL FIBRILLATION WITH RVR (HCC): Status: RESOLVED | Noted: 2024-12-06 | Resolved: 2025-01-30

## 2025-01-30 PROBLEM — S81.801A TRAUMATIC OPEN WOUND OF LOWER LEG, RIGHT, INITIAL ENCOUNTER: Status: RESOLVED | Noted: 2024-12-08 | Resolved: 2025-01-30

## 2025-01-30 PROBLEM — E78.5 HYPERLIPIDEMIA: Status: ACTIVE | Noted: 2017-07-11

## 2025-01-30 PROBLEM — Z99.2 ESRD ON DIALYSIS (HCC): Status: RESOLVED | Noted: 2024-04-24 | Resolved: 2025-01-30

## 2025-01-30 PROBLEM — K59.00 CONSTIPATION, UNSPECIFIED: Status: ACTIVE | Noted: 2018-03-25

## 2025-01-30 PROBLEM — D50.9 IRON DEFICIENCY ANEMIA, UNSPECIFIED: Status: ACTIVE | Noted: 2018-03-25

## 2025-01-30 PROBLEM — S81.801A TRAUMATIC OPEN WOUND OF RIGHT LOWER LEG: Status: ACTIVE | Noted: 2025-01-30

## 2025-01-30 PROBLEM — N18.6 ESRD ON DIALYSIS (HCC): Status: RESOLVED | Noted: 2024-04-24 | Resolved: 2025-01-30

## 2025-01-30 PROBLEM — I25.10 ATHEROSCLEROTIC HEART DISEASE OF NATIVE CORONARY ARTERY WITHOUT ANGINA PECTORIS: Status: ACTIVE | Noted: 2018-03-25

## 2025-01-30 PROBLEM — I95.9 HYPOTENSION: Status: RESOLVED | Noted: 2024-05-17 | Resolved: 2025-01-30

## 2025-01-30 PROCEDURE — 11042 DBRDMT SUBQ TIS 1ST 20SQCM/<: CPT

## 2025-01-30 PROCEDURE — 6370000000 HC RX 637 (ALT 250 FOR IP): Performed by: NURSE PRACTITIONER

## 2025-01-30 PROCEDURE — 99214 OFFICE O/P EST MOD 30 MIN: CPT

## 2025-01-30 RX ORDER — BACITRACIN ZINC AND POLYMYXIN B SULFATE 500; 1000 [USP'U]/G; [USP'U]/G
OINTMENT TOPICAL PRN
OUTPATIENT
Start: 2025-01-30

## 2025-01-30 RX ORDER — SODIUM CHLOR/HYPOCHLOROUS ACID 0.033 %
SOLUTION, IRRIGATION IRRIGATION PRN
OUTPATIENT
Start: 2025-01-30

## 2025-01-30 RX ORDER — LIDOCAINE 50 MG/G
OINTMENT TOPICAL PRN
OUTPATIENT
Start: 2025-01-30

## 2025-01-30 RX ORDER — MUPIROCIN 20 MG/G
OINTMENT TOPICAL PRN
OUTPATIENT
Start: 2025-01-30

## 2025-01-30 RX ORDER — LIDOCAINE HYDROCHLORIDE 20 MG/ML
JELLY TOPICAL PRN
OUTPATIENT
Start: 2025-01-30

## 2025-01-30 RX ORDER — LIDOCAINE HYDROCHLORIDE 40 MG/ML
SOLUTION TOPICAL PRN
OUTPATIENT
Start: 2025-01-30

## 2025-01-30 RX ORDER — GENTAMICIN SULFATE 1 MG/G
OINTMENT TOPICAL PRN
OUTPATIENT
Start: 2025-01-30

## 2025-01-30 RX ORDER — NEOMYCIN/BACITRACIN/POLYMYXINB 3.5-400-5K
OINTMENT (GRAM) TOPICAL PRN
OUTPATIENT
Start: 2025-01-30

## 2025-01-30 RX ORDER — LIDOCAINE 40 MG/G
CREAM TOPICAL PRN
OUTPATIENT
Start: 2025-01-30

## 2025-01-30 RX ORDER — TRIAMCINOLONE ACETONIDE 1 MG/G
OINTMENT TOPICAL PRN
OUTPATIENT
Start: 2025-01-30

## 2025-01-30 RX ORDER — SILVER SULFADIAZINE 10 MG/G
CREAM TOPICAL PRN
OUTPATIENT
Start: 2025-01-30

## 2025-01-30 RX ORDER — GINSENG 100 MG
CAPSULE ORAL PRN
OUTPATIENT
Start: 2025-01-30

## 2025-01-30 RX ORDER — CLOBETASOL PROPIONATE 0.5 MG/G
OINTMENT TOPICAL PRN
OUTPATIENT
Start: 2025-01-30

## 2025-01-30 RX ORDER — LIDOCAINE HYDROCHLORIDE 20 MG/ML
JELLY TOPICAL PRN
Status: DISCONTINUED | OUTPATIENT
Start: 2025-01-30 | End: 2025-01-31 | Stop reason: HOSPADM

## 2025-01-30 RX ORDER — BETAMETHASONE DIPROPIONATE 0.5 MG/G
CREAM TOPICAL PRN
OUTPATIENT
Start: 2025-01-30

## 2025-01-30 RX ADMIN — LIDOCAINE HYDROCHLORIDE: 20 JELLY TOPICAL at 09:09

## 2025-01-30 ASSESSMENT — PAIN SCALES - GENERAL: PAINLEVEL_OUTOF10: 0

## 2025-01-30 NOTE — PLAN OF CARE
Problem: Wound:  Goal: Will show signs of wound healing; wound closure and no evidence of infection  Description: Will show signs of wound healing; wound closure and no evidence of infection  Outcome: Progressing   Patient presents to wound clinic for evaluation and treatment of right leg wound. Discharge instructions/dressing orders per AVS. Follow up appointment scheduled in 2 weeks.    Care plan reviewed with patient and daughter.  Patient and daughter verbalize understanding of the plan of care and contribute to goal setting.

## 2025-01-30 NOTE — PATIENT INSTRUCTIONS
Visit Discharge/Physician Orders:  - Wound debrided today.  - Elevate leg periodically throughout the day to decrease swelling.    Home Care: [facility]    Wound Location: Right leg    Dressing orders:     1) Gather wound care supplies and arrange on clean table.     2) Wash your hands with soap and water or use alcohol based hand  for 20 seconds (sing \"Happy Birthday\" twice).    3) Cleanse wounds with normal saline or wound cleanser and gauze. Pat dry with clean gauze.    4) Right leg - Apply triad paste to wound. Cover with silicone bordered foam dressing. Change 3 times weekly.    - Apply tubigrip compression stocking daily in the morning and remove at bedtime.     Keep all dressings clean & dry.    Follow up visit:   Monday February 10th at 2:00 pm    Supplies:  - Mechanical Debridement was completed today by using a saline and gauze.     Duration of dressings: 30 days    We have sent your supply order to the following company:  [employer]- phone # [phone]   If you don't receive the items you were expecting or don't know what the items are that you received, call the company where the order was sent.   If you are unable to obtain wound supplies, continue to use the supplies you have available until you are able to reach us. It is most important to keep the wound covered at all times.  It is YOUR responsibility to make sure that supplies are re-ordered before you run out. Re-order telephone numbers are included in each package.     Keep next scheduled appointment. Please give 24 hour notice if unable to keep appointment. [phone]    If you experience any of the following, please call the Wound Care Service during business hours: Monday through Friday 8:00 am - 4:30 pm  ([phone]).   *Increase in pain   *Temperature over 101   *Increase in drainage from your wound or a foul odor   *Uncontrolled swelling   *Need for compression bandage changes due to slippage, breakthrough

## 2025-01-30 NOTE — PROGRESS NOTES
Wound Care Supplies      Supply Company:     "[employer] [place] [address] p: [phone] f: [phone]     Ordering Center:   [facility]  [address] [place] [address]  [phone]  WOUND CARE Dept: [phone]   FAX NUMBER [phone]    Patient Information:      Alisha Armas  [address] [facility] [address]   [phone]   : 1945  AGE: 79 y.o.     GENDER: female   EPISODE DATE: 2025    Insurance:      PRIMARY INSURANCE:  Plan: HU[employer] GOLD PLUS HMO  Coverage: HU[employer]NA MEDICARE  Effective Date: 2024  Group Number: [unfilled]  Subscriber Number: [id number] - (Medicare Managed)    Payer/Plan Subscr  Sex Relation Sub. Ins. ID Effective Group Num   1. [employer] MEDICA* RYAN ARMAS* 1945 Female Self [id number]                                   [address] [address]   2. [place] -* RYAN ARMAS* 1945 Female Self [id number]                                    [address]       Patient Wound Information:      Problem List Items Addressed This Visit          Other    Traumatic open wound of right lower leg - Primary    Relevant Medications    lidocaine (XYLOCAINE) 2 % uro-jet    Other Relevant Orders    Initiate Outpatient Wound Care Protocol       WOUNDS REQUIRING DRESSING SUPPLIES:     Wound 24 Radial Proximal;Right (Active)   Number of days: 257       Wound 24 Perineum (Active)   Number of days: 257       Wound Leg Right;Lower;Medial s/p fall (Active)   Wound Image   25 0855   Wound Etiology Traumatic 25 0855   Dressing Status Intact;New dressing applied 25 0855   Wound Cleansed Cleansed with saline 25 0855   Offloading for Diabetic Foot Ulcers Offloading not required 25 0855   Wound Length (cm) 6.9 cm 25 0855   Wound Width (cm) 2.1 cm 25 0855   Wound Depth (cm) 0.1 cm 25 0855   Wound Surface Area (cm^2) 14.49 cm^2 25 0855   Wound 
[facility]  [address]  [address]   [place], [place] [address]  Telephone: [phone]     FAX [phone]    Home health agencies:  [facility] Phone # [phone], Fax # [phone]      Patient Instructions   Visit Discharge/Physician Orders:  - Wound debrided today.  - Elevate leg periodically throughout the day to decrease swelling.    Home Care: [facility]    Wound Location: Right leg    Dressing orders:     1) Gather wound care supplies and arrange on clean table.     2) Wash your hands with soap and water or use alcohol based hand  for 20 seconds (sing \"Happy Birthday\" twice).    3) Cleanse wounds with normal saline or wound cleanser and gauze. Pat dry with clean gauze.    4) Right leg - Apply triad paste to wound. Cover with silicone bordered foam dressing. Change 3 times weekly.    - Apply tubigrip compression stocking daily in the morning and remove at bedtime.     Keep all dressings clean & dry.    Follow up visit:   Monday February 10th at 2:00 pm    Supplies:  - Mechanical Debridement was completed today by using a saline and gauze.     Duration of dressings: 30 days    We have sent your supply order to the following company:  [employer]- phone # [phone]   If you don't receive the items you were expecting or don't know what the items are that you received, call the company where the order was sent.   If you are unable to obtain wound supplies, continue to use the supplies you have available until you are able to reach us. It is most important to keep the wound covered at all times.  It is YOUR responsibility to make sure that supplies are re-ordered before you run out. Re-order telephone numbers are included in each package.     Keep next scheduled appointment. Please give 24 hour notice if unable to keep appointment. [phone]    If you experience any of the following, please call the Wound Care Service during business 
alcohol based hand  for 20 seconds (sing \"Happy Birthday\" twice).    3) Cleanse wounds with normal saline or wound cleanser and gauze. Pat dry with clean gauze.    4) Right leg - Apply triad paste to wound. Cover with silicone bordered foam dressing. Change 3 times weekly.    - Apply tubigrip compression stocking daily in the morning and remove at bedtime.     Keep all dressings clean & dry.    Follow up visit:   Monday February 10th at 2:00 pm    Supplies:  - Mechanical Debridement was completed today by using a saline and gauze.     Duration of dressings: 30 days    We have sent your supply order to the following company:  [employer]- phone # [phone]   If you don't receive the items you were expecting or don't know what the items are that you received, call the company where the order was sent.   If you are unable to obtain wound supplies, continue to use the supplies you have available until you are able to reach us. It is most important to keep the wound covered at all times.  It is YOUR responsibility to make sure that supplies are re-ordered before you run out. Re-order telephone numbers are included in each package.     Keep next scheduled appointment. Please give 24 hour notice if unable to keep appointment. [phone]    If you experience any of the following, please call the Wound Care Service during business hours: Monday through Friday 8:00 am - 4:30 pm  ([phone]).   *Increase in pain   *Temperature over 101   *Increase in drainage from your wound or a foul odor   *Uncontrolled swelling   *Need for compression bandage changes due to slippage, breakthrough drainage    If you need medical attention outside of business hours, please contact your Primary Care Doctor or go to the nearest emergency room.                Electronically signed by LORRI Burris CNP on 1/30/2025 at 9:31 AM

## 2025-02-04 ENCOUNTER — TELEPHONE (OUTPATIENT)
Dept: WOUND CARE | Age: 80
End: 2025-02-04

## 2025-02-04 NOTE — TELEPHONE ENCOUNTER
Received call from Steph at [facility] regarding patient's wound care supplies and not being able to get them due to patient's insurance. Told her I would speak with provider tomorrow and call back if she wants to change any orders. Voiced understanding

## 2025-02-05 ENCOUNTER — TELEPHONE (OUTPATIENT)
Dept: WOUND CARE | Age: 80
End: 2025-02-05

## 2025-02-05 NOTE — TELEPHONE ENCOUNTER
Spoke with CINDI Martinez CNP regarding yesterdays call from [facility]. She states to have patient or family reach out to her insurance company and find what DME company they use. Until she gets her supplies she recommends triad and a guaze. Triad can be purchased. Updated Steph from [facility] with this information.

## 2025-02-05 NOTE — TELEPHONE ENCOUNTER
Received call from Steph at [facility]. Patient's insurance will cover DME products from [employer] or [employer]. Orders sent to [employer].

## 2025-02-10 ENCOUNTER — HOSPITAL ENCOUNTER (OUTPATIENT)
Dept: WOUND CARE | Age: 80
Discharge: HOME | End: 2025-02-10
Attending: NURSE PRACTITIONER

## 2025-03-24 PROBLEM — S81.801A TRAUMATIC OPEN WOUND OF RIGHT LOWER LEG: Status: RESOLVED | Noted: 2025-01-30 | Resolved: 2025-03-24
